# Patient Record
Sex: MALE | NOT HISPANIC OR LATINO | Employment: FULL TIME | URBAN - METROPOLITAN AREA
[De-identification: names, ages, dates, MRNs, and addresses within clinical notes are randomized per-mention and may not be internally consistent; named-entity substitution may affect disease eponyms.]

---

## 2017-04-05 ENCOUNTER — TRANSCRIBE ORDERS (OUTPATIENT)
Dept: ADMINISTRATIVE | Facility: HOSPITAL | Age: 47
End: 2017-04-05

## 2017-04-05 DIAGNOSIS — K21.9 GASTROESOPHAGEAL REFLUX DISEASE WITHOUT ESOPHAGITIS: Primary | ICD-10-CM

## 2017-04-10 ENCOUNTER — HOSPITAL ENCOUNTER (OUTPATIENT)
Dept: RADIOLOGY | Age: 47
Discharge: HOME | End: 2017-04-10
Payer: COMMERCIAL

## 2017-04-10 DIAGNOSIS — K21.9 GASTROESOPHAGEAL REFLUX DISEASE WITHOUT ESOPHAGITIS: ICD-10-CM

## 2017-04-10 PROCEDURE — 76705 ECHO EXAM OF ABDOMEN: CPT

## 2017-07-03 ENCOUNTER — TRANSCRIBE ORDERS (OUTPATIENT)
Dept: ADMINISTRATIVE | Facility: HOSPITAL | Age: 47
End: 2017-07-03

## 2017-07-03 DIAGNOSIS — K21.9 GASTROESOPHAGEAL REFLUX DISEASE, ESOPHAGITIS PRESENCE NOT SPECIFIED: Primary | ICD-10-CM

## 2017-07-12 ENCOUNTER — HOSPITAL ENCOUNTER (OUTPATIENT)
Dept: CT IMAGING | Facility: HOSPITAL | Age: 47
Discharge: HOME | End: 2017-07-12
Payer: COMMERCIAL

## 2017-07-12 DIAGNOSIS — K21.9 GASTROESOPHAGEAL REFLUX DISEASE, ESOPHAGITIS PRESENCE NOT SPECIFIED: ICD-10-CM

## 2017-07-12 PROCEDURE — 70491 CT SOFT TISSUE NECK W/DYE: CPT

## 2017-07-12 RX ADMIN — IOHEXOL 85 ML: 350 INJECTION, SOLUTION INTRAVENOUS at 13:24

## 2018-09-10 ENCOUNTER — TRANSCRIBE ORDERS (OUTPATIENT)
Dept: ADMINISTRATIVE | Facility: HOSPITAL | Age: 48
End: 2018-09-10

## 2018-09-10 DIAGNOSIS — K21.9 GASTROESOPHAGEAL REFLUX DISEASE, ESOPHAGITIS PRESENCE NOT SPECIFIED: Primary | ICD-10-CM

## 2018-10-04 ENCOUNTER — HOSPITAL ENCOUNTER (OUTPATIENT)
Dept: NUCLEAR MEDICINE | Facility: HOSPITAL | Age: 48
Discharge: HOME | End: 2018-10-04
Payer: COMMERCIAL

## 2018-10-04 DIAGNOSIS — K21.9 GASTROESOPHAGEAL REFLUX DISEASE, ESOPHAGITIS PRESENCE NOT SPECIFIED: ICD-10-CM

## 2018-10-04 PROCEDURE — 78264 GASTRIC EMPTYING IMG STUDY: CPT

## 2018-10-04 PROCEDURE — A9541 TC99M SULFUR COLLOID: HCPCS

## 2019-02-11 ENCOUNTER — HOSPITAL ENCOUNTER (OUTPATIENT)
Dept: RADIOLOGY | Facility: HOSPITAL | Age: 49
Discharge: HOME | End: 2019-02-11
Payer: COMMERCIAL

## 2019-02-11 ENCOUNTER — TRANSCRIBE ORDERS (OUTPATIENT)
Dept: ADMINISTRATIVE | Facility: HOSPITAL | Age: 49
End: 2019-02-11

## 2019-02-11 DIAGNOSIS — M25.532 BILATERAL WRIST PAIN: ICD-10-CM

## 2019-02-11 DIAGNOSIS — M25.531 BILATERAL WRIST PAIN: Primary | ICD-10-CM

## 2019-02-11 DIAGNOSIS — M25.532 BILATERAL WRIST PAIN: Primary | ICD-10-CM

## 2019-02-11 DIAGNOSIS — M25.531 BILATERAL WRIST PAIN: ICD-10-CM

## 2019-02-11 PROCEDURE — 73221 MRI JOINT UPR EXTREM W/O DYE: CPT

## 2019-05-06 ENCOUNTER — EVALUATION (OUTPATIENT)
Dept: OCCUPATIONAL THERAPY | Facility: CLINIC | Age: 49
End: 2019-05-06
Payer: COMMERCIAL

## 2019-05-06 DIAGNOSIS — S63.592A TFCC (TRIANGULAR FIBROCARTILAGE COMPLEX) TEAR, LEFT, INITIAL ENCOUNTER: Primary | ICD-10-CM

## 2019-05-06 PROCEDURE — 97140 MANUAL THERAPY 1/> REGIONS: CPT | Performed by: OCCUPATIONAL THERAPIST

## 2019-05-06 PROCEDURE — 97165 OT EVAL LOW COMPLEX 30 MIN: CPT | Performed by: OCCUPATIONAL THERAPIST

## 2019-05-06 PROCEDURE — 97010 HOT OR COLD PACKS THERAPY: CPT | Performed by: OCCUPATIONAL THERAPIST

## 2019-05-08 ENCOUNTER — OFFICE VISIT (OUTPATIENT)
Dept: OCCUPATIONAL THERAPY | Facility: CLINIC | Age: 49
End: 2019-05-08
Payer: COMMERCIAL

## 2019-05-08 DIAGNOSIS — S63.592A TFCC (TRIANGULAR FIBROCARTILAGE COMPLEX) TEAR, LEFT, INITIAL ENCOUNTER: Primary | ICD-10-CM

## 2019-05-08 PROCEDURE — 97140 MANUAL THERAPY 1/> REGIONS: CPT | Performed by: OCCUPATIONAL THERAPIST

## 2019-05-08 PROCEDURE — 97110 THERAPEUTIC EXERCISES: CPT | Performed by: OCCUPATIONAL THERAPIST

## 2019-05-08 PROCEDURE — 97010 HOT OR COLD PACKS THERAPY: CPT | Performed by: OCCUPATIONAL THERAPIST

## 2019-05-10 ENCOUNTER — EVALUATION (OUTPATIENT)
Dept: PHYSICAL THERAPY | Facility: CLINIC | Age: 49
End: 2019-05-10
Payer: COMMERCIAL

## 2019-05-10 ENCOUNTER — TRANSCRIBE ORDERS (OUTPATIENT)
Dept: PHYSICAL THERAPY | Facility: CLINIC | Age: 49
End: 2019-05-10

## 2019-05-10 DIAGNOSIS — M75.02 ADHESIVE CAPSULITIS OF LEFT SHOULDER: Primary | ICD-10-CM

## 2019-05-10 PROCEDURE — 97110 THERAPEUTIC EXERCISES: CPT | Performed by: PHYSICAL THERAPIST

## 2019-05-10 PROCEDURE — 97161 PT EVAL LOW COMPLEX 20 MIN: CPT | Performed by: PHYSICAL THERAPIST

## 2019-05-13 ENCOUNTER — APPOINTMENT (OUTPATIENT)
Dept: OCCUPATIONAL THERAPY | Facility: CLINIC | Age: 49
End: 2019-05-13
Payer: COMMERCIAL

## 2019-05-14 ENCOUNTER — OFFICE VISIT (OUTPATIENT)
Dept: OCCUPATIONAL THERAPY | Facility: CLINIC | Age: 49
End: 2019-05-14
Payer: COMMERCIAL

## 2019-05-14 ENCOUNTER — APPOINTMENT (OUTPATIENT)
Dept: OCCUPATIONAL THERAPY | Facility: CLINIC | Age: 49
End: 2019-05-14
Payer: COMMERCIAL

## 2019-05-14 ENCOUNTER — APPOINTMENT (OUTPATIENT)
Dept: PHYSICAL THERAPY | Facility: CLINIC | Age: 49
End: 2019-05-14
Payer: COMMERCIAL

## 2019-05-14 ENCOUNTER — OFFICE VISIT (OUTPATIENT)
Dept: PHYSICAL THERAPY | Facility: CLINIC | Age: 49
End: 2019-05-14
Payer: COMMERCIAL

## 2019-05-14 DIAGNOSIS — S63.592A TFCC (TRIANGULAR FIBROCARTILAGE COMPLEX) TEAR, LEFT, INITIAL ENCOUNTER: Primary | ICD-10-CM

## 2019-05-14 DIAGNOSIS — M75.02 ADHESIVE CAPSULITIS OF LEFT SHOULDER: Primary | ICD-10-CM

## 2019-05-14 PROCEDURE — 97110 THERAPEUTIC EXERCISES: CPT

## 2019-05-14 PROCEDURE — 97112 NEUROMUSCULAR REEDUCATION: CPT

## 2019-05-14 PROCEDURE — 97140 MANUAL THERAPY 1/> REGIONS: CPT | Performed by: OCCUPATIONAL THERAPIST

## 2019-05-14 PROCEDURE — 97110 THERAPEUTIC EXERCISES: CPT | Performed by: OCCUPATIONAL THERAPIST

## 2019-05-14 PROCEDURE — 97140 MANUAL THERAPY 1/> REGIONS: CPT

## 2019-05-15 ENCOUNTER — APPOINTMENT (OUTPATIENT)
Dept: OCCUPATIONAL THERAPY | Facility: CLINIC | Age: 49
End: 2019-05-15
Payer: COMMERCIAL

## 2019-05-16 ENCOUNTER — OFFICE VISIT (OUTPATIENT)
Dept: PHYSICAL THERAPY | Facility: CLINIC | Age: 49
End: 2019-05-16
Payer: COMMERCIAL

## 2019-05-16 ENCOUNTER — OFFICE VISIT (OUTPATIENT)
Dept: OCCUPATIONAL THERAPY | Facility: CLINIC | Age: 49
End: 2019-05-16
Payer: COMMERCIAL

## 2019-05-16 DIAGNOSIS — M75.02 ADHESIVE CAPSULITIS OF LEFT SHOULDER: Primary | ICD-10-CM

## 2019-05-16 DIAGNOSIS — S63.592A TFCC (TRIANGULAR FIBROCARTILAGE COMPLEX) TEAR, LEFT, INITIAL ENCOUNTER: Primary | ICD-10-CM

## 2019-05-16 PROCEDURE — 97110 THERAPEUTIC EXERCISES: CPT | Performed by: OCCUPATIONAL THERAPIST

## 2019-05-16 PROCEDURE — 97140 MANUAL THERAPY 1/> REGIONS: CPT | Performed by: OCCUPATIONAL THERAPIST

## 2019-05-16 PROCEDURE — 97140 MANUAL THERAPY 1/> REGIONS: CPT | Performed by: PHYSICAL THERAPIST

## 2019-05-16 PROCEDURE — 97110 THERAPEUTIC EXERCISES: CPT | Performed by: PHYSICAL THERAPIST

## 2019-05-20 ENCOUNTER — APPOINTMENT (OUTPATIENT)
Dept: OCCUPATIONAL THERAPY | Facility: CLINIC | Age: 49
End: 2019-05-20
Payer: COMMERCIAL

## 2019-05-21 ENCOUNTER — OFFICE VISIT (OUTPATIENT)
Dept: PHYSICAL THERAPY | Facility: CLINIC | Age: 49
End: 2019-05-21
Payer: COMMERCIAL

## 2019-05-21 ENCOUNTER — OFFICE VISIT (OUTPATIENT)
Dept: OCCUPATIONAL THERAPY | Facility: CLINIC | Age: 49
End: 2019-05-21
Payer: COMMERCIAL

## 2019-05-21 DIAGNOSIS — S63.592A TFCC (TRIANGULAR FIBROCARTILAGE COMPLEX) TEAR, LEFT, INITIAL ENCOUNTER: Primary | ICD-10-CM

## 2019-05-21 DIAGNOSIS — M75.02 ADHESIVE CAPSULITIS OF LEFT SHOULDER: Primary | ICD-10-CM

## 2019-05-21 PROCEDURE — 97110 THERAPEUTIC EXERCISES: CPT | Performed by: OCCUPATIONAL THERAPIST

## 2019-05-21 PROCEDURE — 97140 MANUAL THERAPY 1/> REGIONS: CPT | Performed by: OCCUPATIONAL THERAPIST

## 2019-05-21 PROCEDURE — 97110 THERAPEUTIC EXERCISES: CPT

## 2019-05-21 PROCEDURE — 97140 MANUAL THERAPY 1/> REGIONS: CPT

## 2019-05-22 ENCOUNTER — APPOINTMENT (OUTPATIENT)
Dept: OCCUPATIONAL THERAPY | Facility: CLINIC | Age: 49
End: 2019-05-22
Payer: COMMERCIAL

## 2019-05-23 ENCOUNTER — APPOINTMENT (OUTPATIENT)
Dept: PHYSICAL THERAPY | Facility: CLINIC | Age: 49
End: 2019-05-23
Payer: COMMERCIAL

## 2019-05-24 ENCOUNTER — OFFICE VISIT (OUTPATIENT)
Dept: OCCUPATIONAL THERAPY | Facility: CLINIC | Age: 49
End: 2019-05-24
Payer: COMMERCIAL

## 2019-05-24 ENCOUNTER — APPOINTMENT (OUTPATIENT)
Dept: PHYSICAL THERAPY | Facility: CLINIC | Age: 49
End: 2019-05-24
Payer: COMMERCIAL

## 2019-05-24 ENCOUNTER — OFFICE VISIT (OUTPATIENT)
Dept: PHYSICAL THERAPY | Facility: CLINIC | Age: 49
End: 2019-05-24
Payer: COMMERCIAL

## 2019-05-24 DIAGNOSIS — M75.02 ADHESIVE CAPSULITIS OF LEFT SHOULDER: Primary | ICD-10-CM

## 2019-05-24 DIAGNOSIS — S63.592A TFCC (TRIANGULAR FIBROCARTILAGE COMPLEX) TEAR, LEFT, INITIAL ENCOUNTER: Primary | ICD-10-CM

## 2019-05-24 PROCEDURE — 97140 MANUAL THERAPY 1/> REGIONS: CPT | Performed by: OCCUPATIONAL THERAPIST

## 2019-05-24 PROCEDURE — 97140 MANUAL THERAPY 1/> REGIONS: CPT

## 2019-05-24 PROCEDURE — 97110 THERAPEUTIC EXERCISES: CPT | Performed by: OCCUPATIONAL THERAPIST

## 2019-05-24 PROCEDURE — 97110 THERAPEUTIC EXERCISES: CPT

## 2019-05-28 ENCOUNTER — OFFICE VISIT (OUTPATIENT)
Dept: OCCUPATIONAL THERAPY | Facility: CLINIC | Age: 49
End: 2019-05-28
Payer: COMMERCIAL

## 2019-05-28 ENCOUNTER — OFFICE VISIT (OUTPATIENT)
Dept: PHYSICAL THERAPY | Facility: CLINIC | Age: 49
End: 2019-05-28
Payer: COMMERCIAL

## 2019-05-28 DIAGNOSIS — S63.592A TFCC (TRIANGULAR FIBROCARTILAGE COMPLEX) TEAR, LEFT, INITIAL ENCOUNTER: Primary | ICD-10-CM

## 2019-05-28 DIAGNOSIS — M75.02 ADHESIVE CAPSULITIS OF LEFT SHOULDER: Primary | ICD-10-CM

## 2019-05-28 PROCEDURE — 97140 MANUAL THERAPY 1/> REGIONS: CPT | Performed by: OCCUPATIONAL THERAPIST

## 2019-05-28 PROCEDURE — 97110 THERAPEUTIC EXERCISES: CPT | Performed by: OCCUPATIONAL THERAPIST

## 2019-05-28 PROCEDURE — 97140 MANUAL THERAPY 1/> REGIONS: CPT

## 2019-05-28 PROCEDURE — 97022 WHIRLPOOL THERAPY: CPT | Performed by: OCCUPATIONAL THERAPIST

## 2019-05-28 PROCEDURE — 97110 THERAPEUTIC EXERCISES: CPT

## 2019-05-29 ENCOUNTER — APPOINTMENT (OUTPATIENT)
Dept: OCCUPATIONAL THERAPY | Facility: CLINIC | Age: 49
End: 2019-05-29
Payer: COMMERCIAL

## 2019-05-30 ENCOUNTER — TRANSCRIBE ORDERS (OUTPATIENT)
Dept: OCCUPATIONAL THERAPY | Facility: CLINIC | Age: 49
End: 2019-05-30

## 2019-05-30 ENCOUNTER — OFFICE VISIT (OUTPATIENT)
Dept: OCCUPATIONAL THERAPY | Facility: CLINIC | Age: 49
End: 2019-05-30
Payer: COMMERCIAL

## 2019-05-30 ENCOUNTER — OFFICE VISIT (OUTPATIENT)
Dept: PHYSICAL THERAPY | Facility: CLINIC | Age: 49
End: 2019-05-30
Payer: COMMERCIAL

## 2019-05-30 DIAGNOSIS — S63.592A TFCC (TRIANGULAR FIBROCARTILAGE COMPLEX) TEAR, LEFT, INITIAL ENCOUNTER: Primary | ICD-10-CM

## 2019-05-30 DIAGNOSIS — M75.02 ADHESIVE CAPSULITIS OF LEFT SHOULDER: Primary | ICD-10-CM

## 2019-05-30 PROCEDURE — 97110 THERAPEUTIC EXERCISES: CPT | Performed by: OCCUPATIONAL THERAPIST

## 2019-05-30 PROCEDURE — 97110 THERAPEUTIC EXERCISES: CPT

## 2019-05-30 PROCEDURE — 97140 MANUAL THERAPY 1/> REGIONS: CPT | Performed by: OCCUPATIONAL THERAPIST

## 2019-05-30 PROCEDURE — 97140 MANUAL THERAPY 1/> REGIONS: CPT

## 2019-06-03 ENCOUNTER — APPOINTMENT (OUTPATIENT)
Dept: OCCUPATIONAL THERAPY | Facility: CLINIC | Age: 49
End: 2019-06-03
Payer: COMMERCIAL

## 2019-06-04 ENCOUNTER — OFFICE VISIT (OUTPATIENT)
Dept: PHYSICAL THERAPY | Facility: CLINIC | Age: 49
End: 2019-06-04
Payer: COMMERCIAL

## 2019-06-04 ENCOUNTER — OFFICE VISIT (OUTPATIENT)
Dept: OCCUPATIONAL THERAPY | Facility: CLINIC | Age: 49
End: 2019-06-04
Payer: COMMERCIAL

## 2019-06-04 DIAGNOSIS — M75.02 ADHESIVE CAPSULITIS OF LEFT SHOULDER: Primary | ICD-10-CM

## 2019-06-04 DIAGNOSIS — S63.592A TFCC (TRIANGULAR FIBROCARTILAGE COMPLEX) TEAR, LEFT, INITIAL ENCOUNTER: Primary | ICD-10-CM

## 2019-06-04 PROCEDURE — 97110 THERAPEUTIC EXERCISES: CPT | Performed by: PHYSICAL THERAPIST

## 2019-06-04 PROCEDURE — 97112 NEUROMUSCULAR REEDUCATION: CPT | Performed by: PHYSICAL THERAPIST

## 2019-06-04 PROCEDURE — 97110 THERAPEUTIC EXERCISES: CPT | Performed by: OCCUPATIONAL THERAPIST

## 2019-06-04 PROCEDURE — 97140 MANUAL THERAPY 1/> REGIONS: CPT | Performed by: PHYSICAL THERAPIST

## 2019-06-04 PROCEDURE — 97140 MANUAL THERAPY 1/> REGIONS: CPT | Performed by: OCCUPATIONAL THERAPIST

## 2019-06-05 ENCOUNTER — APPOINTMENT (OUTPATIENT)
Dept: OCCUPATIONAL THERAPY | Facility: CLINIC | Age: 49
End: 2019-06-05
Payer: COMMERCIAL

## 2019-06-06 ENCOUNTER — OFFICE VISIT (OUTPATIENT)
Dept: PHYSICAL THERAPY | Facility: CLINIC | Age: 49
End: 2019-06-06
Payer: COMMERCIAL

## 2019-06-06 ENCOUNTER — OFFICE VISIT (OUTPATIENT)
Dept: OCCUPATIONAL THERAPY | Facility: CLINIC | Age: 49
End: 2019-06-06
Payer: COMMERCIAL

## 2019-06-06 DIAGNOSIS — M75.02 ADHESIVE CAPSULITIS OF LEFT SHOULDER: Primary | ICD-10-CM

## 2019-06-06 DIAGNOSIS — S63.592A TFCC (TRIANGULAR FIBROCARTILAGE COMPLEX) TEAR, LEFT, INITIAL ENCOUNTER: Primary | ICD-10-CM

## 2019-06-06 PROCEDURE — 97110 THERAPEUTIC EXERCISES: CPT | Performed by: PHYSICAL THERAPY ASSISTANT

## 2019-06-06 PROCEDURE — 97035 APP MDLTY 1+ULTRASOUND EA 15: CPT | Performed by: OCCUPATIONAL THERAPIST

## 2019-06-06 PROCEDURE — 97140 MANUAL THERAPY 1/> REGIONS: CPT | Performed by: PHYSICAL THERAPY ASSISTANT

## 2019-06-06 PROCEDURE — 97140 MANUAL THERAPY 1/> REGIONS: CPT | Performed by: OCCUPATIONAL THERAPIST

## 2019-06-06 PROCEDURE — 97110 THERAPEUTIC EXERCISES: CPT | Performed by: OCCUPATIONAL THERAPIST

## 2019-06-10 ENCOUNTER — EVALUATION (OUTPATIENT)
Dept: PHYSICAL THERAPY | Facility: CLINIC | Age: 49
End: 2019-06-10
Payer: COMMERCIAL

## 2019-06-10 ENCOUNTER — OFFICE VISIT (OUTPATIENT)
Dept: OCCUPATIONAL THERAPY | Facility: CLINIC | Age: 49
End: 2019-06-10
Payer: COMMERCIAL

## 2019-06-10 ENCOUNTER — APPOINTMENT (OUTPATIENT)
Dept: OCCUPATIONAL THERAPY | Facility: CLINIC | Age: 49
End: 2019-06-10
Payer: COMMERCIAL

## 2019-06-10 DIAGNOSIS — M75.02 ADHESIVE CAPSULITIS OF LEFT SHOULDER: Primary | ICD-10-CM

## 2019-06-10 DIAGNOSIS — S63.592A TFCC (TRIANGULAR FIBROCARTILAGE COMPLEX) TEAR, LEFT, INITIAL ENCOUNTER: Primary | ICD-10-CM

## 2019-06-10 PROCEDURE — 97110 THERAPEUTIC EXERCISES: CPT | Performed by: OCCUPATIONAL THERAPIST

## 2019-06-10 PROCEDURE — 97112 NEUROMUSCULAR REEDUCATION: CPT | Performed by: PHYSICAL THERAPIST

## 2019-06-10 PROCEDURE — 97140 MANUAL THERAPY 1/> REGIONS: CPT | Performed by: OCCUPATIONAL THERAPIST

## 2019-06-10 PROCEDURE — 97140 MANUAL THERAPY 1/> REGIONS: CPT | Performed by: PHYSICAL THERAPIST

## 2019-06-10 PROCEDURE — 97110 THERAPEUTIC EXERCISES: CPT | Performed by: PHYSICAL THERAPIST

## 2019-06-12 ENCOUNTER — OFFICE VISIT (OUTPATIENT)
Dept: PHYSICAL THERAPY | Facility: CLINIC | Age: 49
End: 2019-06-12
Payer: COMMERCIAL

## 2019-06-12 ENCOUNTER — APPOINTMENT (OUTPATIENT)
Dept: OCCUPATIONAL THERAPY | Facility: CLINIC | Age: 49
End: 2019-06-12
Payer: COMMERCIAL

## 2019-06-12 ENCOUNTER — OFFICE VISIT (OUTPATIENT)
Dept: OCCUPATIONAL THERAPY | Facility: CLINIC | Age: 49
End: 2019-06-12
Payer: COMMERCIAL

## 2019-06-12 DIAGNOSIS — S63.592A TFCC (TRIANGULAR FIBROCARTILAGE COMPLEX) TEAR, LEFT, INITIAL ENCOUNTER: Primary | ICD-10-CM

## 2019-06-12 DIAGNOSIS — M75.02 ADHESIVE CAPSULITIS OF LEFT SHOULDER: Primary | ICD-10-CM

## 2019-06-12 PROCEDURE — 97140 MANUAL THERAPY 1/> REGIONS: CPT

## 2019-06-12 PROCEDURE — 97110 THERAPEUTIC EXERCISES: CPT

## 2019-06-12 PROCEDURE — 97140 MANUAL THERAPY 1/> REGIONS: CPT | Performed by: OCCUPATIONAL THERAPIST

## 2019-06-12 PROCEDURE — 97035 APP MDLTY 1+ULTRASOUND EA 15: CPT | Performed by: OCCUPATIONAL THERAPIST

## 2019-06-12 PROCEDURE — 97110 THERAPEUTIC EXERCISES: CPT | Performed by: OCCUPATIONAL THERAPIST

## 2019-06-12 PROCEDURE — 97112 NEUROMUSCULAR REEDUCATION: CPT

## 2019-06-13 ENCOUNTER — APPOINTMENT (OUTPATIENT)
Dept: PHYSICAL THERAPY | Facility: CLINIC | Age: 49
End: 2019-06-13
Payer: COMMERCIAL

## 2019-06-17 ENCOUNTER — APPOINTMENT (OUTPATIENT)
Dept: OCCUPATIONAL THERAPY | Facility: CLINIC | Age: 49
End: 2019-06-17
Payer: COMMERCIAL

## 2019-06-18 ENCOUNTER — APPOINTMENT (OUTPATIENT)
Dept: PHYSICAL THERAPY | Facility: CLINIC | Age: 49
End: 2019-06-18
Payer: COMMERCIAL

## 2019-06-18 ENCOUNTER — APPOINTMENT (OUTPATIENT)
Dept: OCCUPATIONAL THERAPY | Facility: CLINIC | Age: 49
End: 2019-06-18
Payer: COMMERCIAL

## 2019-06-19 ENCOUNTER — APPOINTMENT (OUTPATIENT)
Dept: OCCUPATIONAL THERAPY | Facility: CLINIC | Age: 49
End: 2019-06-19
Payer: COMMERCIAL

## 2019-06-19 ENCOUNTER — OFFICE VISIT (OUTPATIENT)
Dept: OCCUPATIONAL THERAPY | Facility: CLINIC | Age: 49
End: 2019-06-19
Payer: COMMERCIAL

## 2019-06-19 ENCOUNTER — OFFICE VISIT (OUTPATIENT)
Dept: PHYSICAL THERAPY | Facility: CLINIC | Age: 49
End: 2019-06-19
Payer: COMMERCIAL

## 2019-06-19 DIAGNOSIS — S63.592A TFCC (TRIANGULAR FIBROCARTILAGE COMPLEX) TEAR, LEFT, INITIAL ENCOUNTER: Primary | ICD-10-CM

## 2019-06-19 DIAGNOSIS — M75.02 ADHESIVE CAPSULITIS OF LEFT SHOULDER: Primary | ICD-10-CM

## 2019-06-19 PROCEDURE — 97110 THERAPEUTIC EXERCISES: CPT

## 2019-06-19 PROCEDURE — 97035 APP MDLTY 1+ULTRASOUND EA 15: CPT

## 2019-06-19 PROCEDURE — 97140 MANUAL THERAPY 1/> REGIONS: CPT

## 2019-06-19 PROCEDURE — 97112 NEUROMUSCULAR REEDUCATION: CPT

## 2019-06-20 ENCOUNTER — OFFICE VISIT (OUTPATIENT)
Dept: PHYSICAL THERAPY | Facility: CLINIC | Age: 49
End: 2019-06-20
Payer: COMMERCIAL

## 2019-06-20 ENCOUNTER — OFFICE VISIT (OUTPATIENT)
Dept: OCCUPATIONAL THERAPY | Facility: CLINIC | Age: 49
End: 2019-06-20
Payer: COMMERCIAL

## 2019-06-20 DIAGNOSIS — M75.02 ADHESIVE CAPSULITIS OF LEFT SHOULDER: Primary | ICD-10-CM

## 2019-06-20 DIAGNOSIS — S63.592A TFCC (TRIANGULAR FIBROCARTILAGE COMPLEX) TEAR, LEFT, INITIAL ENCOUNTER: Primary | ICD-10-CM

## 2019-06-20 PROCEDURE — 97110 THERAPEUTIC EXERCISES: CPT | Performed by: OCCUPATIONAL THERAPIST

## 2019-06-20 PROCEDURE — 97140 MANUAL THERAPY 1/> REGIONS: CPT | Performed by: OCCUPATIONAL THERAPIST

## 2019-06-20 PROCEDURE — 97140 MANUAL THERAPY 1/> REGIONS: CPT | Performed by: PHYSICAL THERAPIST

## 2019-06-20 PROCEDURE — 97112 NEUROMUSCULAR REEDUCATION: CPT | Performed by: PHYSICAL THERAPIST

## 2019-06-20 PROCEDURE — 97035 APP MDLTY 1+ULTRASOUND EA 15: CPT | Performed by: OCCUPATIONAL THERAPIST

## 2019-06-20 PROCEDURE — 97110 THERAPEUTIC EXERCISES: CPT | Performed by: PHYSICAL THERAPIST

## 2019-06-24 ENCOUNTER — APPOINTMENT (OUTPATIENT)
Dept: OCCUPATIONAL THERAPY | Facility: CLINIC | Age: 49
End: 2019-06-24
Payer: COMMERCIAL

## 2019-06-25 ENCOUNTER — OFFICE VISIT (OUTPATIENT)
Dept: PHYSICAL THERAPY | Facility: CLINIC | Age: 49
End: 2019-06-25
Payer: COMMERCIAL

## 2019-06-25 ENCOUNTER — OFFICE VISIT (OUTPATIENT)
Dept: OCCUPATIONAL THERAPY | Facility: CLINIC | Age: 49
End: 2019-06-25
Payer: COMMERCIAL

## 2019-06-25 DIAGNOSIS — M75.02 ADHESIVE CAPSULITIS OF LEFT SHOULDER: Primary | ICD-10-CM

## 2019-06-25 DIAGNOSIS — S63.592A TFCC (TRIANGULAR FIBROCARTILAGE COMPLEX) TEAR, LEFT, INITIAL ENCOUNTER: Primary | ICD-10-CM

## 2019-06-25 PROCEDURE — 97112 NEUROMUSCULAR REEDUCATION: CPT | Performed by: PHYSICAL THERAPIST

## 2019-06-25 PROCEDURE — 97140 MANUAL THERAPY 1/> REGIONS: CPT | Performed by: PHYSICAL THERAPIST

## 2019-06-25 PROCEDURE — 97140 MANUAL THERAPY 1/> REGIONS: CPT | Performed by: OCCUPATIONAL THERAPIST

## 2019-06-25 PROCEDURE — 97110 THERAPEUTIC EXERCISES: CPT | Performed by: PHYSICAL THERAPIST

## 2019-06-25 PROCEDURE — 97110 THERAPEUTIC EXERCISES: CPT | Performed by: OCCUPATIONAL THERAPIST

## 2019-06-26 ENCOUNTER — APPOINTMENT (OUTPATIENT)
Dept: OCCUPATIONAL THERAPY | Facility: CLINIC | Age: 49
End: 2019-06-26
Payer: COMMERCIAL

## 2019-06-27 ENCOUNTER — APPOINTMENT (OUTPATIENT)
Dept: PHYSICAL THERAPY | Facility: CLINIC | Age: 49
End: 2019-06-27
Payer: COMMERCIAL

## 2019-06-27 ENCOUNTER — APPOINTMENT (OUTPATIENT)
Dept: OCCUPATIONAL THERAPY | Facility: CLINIC | Age: 49
End: 2019-06-27
Payer: COMMERCIAL

## 2019-07-01 ENCOUNTER — OFFICE VISIT (OUTPATIENT)
Dept: OCCUPATIONAL THERAPY | Facility: CLINIC | Age: 49
End: 2019-07-01
Payer: COMMERCIAL

## 2019-07-01 ENCOUNTER — TRANSCRIBE ORDERS (OUTPATIENT)
Dept: PHYSICAL THERAPY | Facility: CLINIC | Age: 49
End: 2019-07-01

## 2019-07-01 ENCOUNTER — OFFICE VISIT (OUTPATIENT)
Dept: PHYSICAL THERAPY | Facility: CLINIC | Age: 49
End: 2019-07-01
Payer: COMMERCIAL

## 2019-07-01 DIAGNOSIS — S63.592A TFCC (TRIANGULAR FIBROCARTILAGE COMPLEX) TEAR, LEFT, INITIAL ENCOUNTER: Primary | ICD-10-CM

## 2019-07-01 DIAGNOSIS — M75.02 ADHESIVE CAPSULITIS OF LEFT SHOULDER: Primary | ICD-10-CM

## 2019-07-01 PROCEDURE — 97110 THERAPEUTIC EXERCISES: CPT | Performed by: OCCUPATIONAL THERAPIST

## 2019-07-01 PROCEDURE — 97110 THERAPEUTIC EXERCISES: CPT

## 2019-07-01 PROCEDURE — 97530 THERAPEUTIC ACTIVITIES: CPT | Performed by: OCCUPATIONAL THERAPIST

## 2019-07-01 PROCEDURE — 97140 MANUAL THERAPY 1/> REGIONS: CPT | Performed by: OCCUPATIONAL THERAPIST

## 2019-07-01 PROCEDURE — 97112 NEUROMUSCULAR REEDUCATION: CPT

## 2019-07-01 PROCEDURE — 97140 MANUAL THERAPY 1/> REGIONS: CPT

## 2019-07-01 NOTE — LETTER
2019    Ramona Gan MD  [address] [facility] [address]    Patient: Herb Hampton   YOB: 1970   Date of Visit: 2019     Encounter Diagnosis     ICD-10-CM    1  TFCC (triangular fibrocartilage complex) tear, left, initial encounter B45 892R        Dear Dr Jerry Mik:    Please review the attached Plan of Care from [address] [place] [address] recent visit  Please verify that you agree therapy should continue by signing the attached document and sending it back to our office  If you have any questions or concerns, please don't hesitate to call  Sincerely,    Tiffany Thomas OT      Referring Provider:     I certify that I have read the below Plan of Care and certify the need for these services furnished under this plan of treatment while under my care  Ramona Gan MD  [address] [place] [address] 109: [phone]        Daily Note     Today's date: 2019  Patient name: Herb Hampton  : 1970  MRN: [id number]  Referring provider: India Brown MD  Dx:   Encounter Diagnosis     ICD-10-CM    1  TFCC (triangular fibrocartilage complex) tear, left, initial encounter S63 [id number]                   Subjective: I am doing better    I do my HEP       Objective: See treatment diary below          Precautions: sx 3/12/19    Daily Treatment Diary     Manual  6/10 6/12 6/19 6/20 6/25 7/1       retrograde 4m 4m 5m 5m 5m 5m       Graston L wrist 4m 4m 5m 4m 4m 4m       Grade 2 MOB wrist 4m 4m  3m 3m 3m                                     Exercise Diary  6/10 6/12 6/19 6/20 6/25 7/1       Intrinsic stretch x10 x10 x10 x10 x10 x10       Extrinsic stretch x10 x10 x10 x10 x10 x10       Wrist ROM x10 Dowel 2x10  Dowel  2x10 Dowel  2x10        p/s flexbar green  3x10 flexbar  green 3x10 RFB 2x10 x10 x10 Green  3x10       Wrist maze  x5  x5 x5        Hook/fist              gripping Black 3x10 BPW 3x10 Black PW  3x10 GPW BPW  3x10 BPW  3x10       Pinch pins   pegs Black 3x10 Black 3x10 black  3x10 Red  3x10 Black   3x10 Black  30x       Wrist e/f 3#  3x10 3#  3x10 1# 3x10 #2 3x10 2#  3x10 3#  3x10       Biceps curl      7  5#KB  3x10                                                                                                                            HEP- TGEs, Wrist AROM reviewed                Modalities  6/10 6/12 6/19 6/20 6/25 7/1       MHP 10m    10m 10m       CP post  5m 5' 5m 5m 5m       Cont US dw   8m 8' 8m                              Assessment  Assessment details: Pt  Has been  s/p L TFCC reapair for post surgical stiffness and edema, scar tissue, limited ROM and strength of L dominant hand with ADLs  Pt has decreased strength and ROM   He has improved use of L for light ADL   Functional limitations: Pt  is using his L hand for  ADLs   Prognosis: good    Goals  STG( 10 visits)  1  Independent with HEP- met   2  Decrease edema to WNLs L hand  -partially mat   3  Increase L wrist ROM by 10 degrees - met  LTG(20 visits)  1  Improve FOTO score to predicted outcome or greater  - not met   2   Improve L wrist ROM to WNLs- not met   3  L  strength > 50lbs    Plan  Patient would benefit from: skilled occupational therapy  Planned modality interventions: thermotherapy: hydrocollator packs, fluidotherapy and cryotherapy  Planned therapy interventions: joint mobilization, manual therapy, therapeutic exercise, stretching, strengthening, graded exercise, home exercise program, functional ROM exercises and fine motor coordination training  Frequency: 2x week  Plan of Care beginning date: 2019  Plan of Care expiration date: 2019  Treatment plan discussed with: patient        Subjective Evaluation    History of Present Illness  Mechanism of injury: Pt  Was performing CPR and injured his wrist 19  MRI showed a L TFCC tear, he underwent a TFCC 3/12/19        Quality of life: good    Pain  Current pain ratin  At worst pain ratin  Quality: discomfort and tight  Progression: improved    Social Support  Lives with: significant other    Employment status: working ()  Hand dominance: left    Treatments  Current treatment: occupational therapy  Patient Goals  Patient goals for therapy: increased motion, increased strength, return to work and decreased edema          Objective     Tenderness     Left Wrist/Hand   Tenderness in the TFCC  Neurological Testing     Sensation     Wrist/Hand   Left   Intact: light touch    Active Range of Motion     Left Elbow   Forearm supination: 70 previous 50 degrees   Forearm pronation:72 , previous 68 degrees     Left Wrist   Wrist flexion:60, previous  20 degrees    Wrist extension: 60 , previous 48 degrees   Radial deviation : 18, previous 10 degrees   Ulnar deviation:22, previous 10 degrees      Left Thumb   Opposition: Opposes to tip of 5th digit    Additional Active Range of Motion Details  Pt  Has limited L shoulder mobility        Strength/Myotome Testing     Left Wrist/Hand   Wrist extension:5; previous  3-  Wrist flexion:5 previous  3-     2 R/L= 101/50    Swelling     Left Wrist/Hand     Circumference wrist:17 2 , previous  17 7 cm/ R= 16 2           Plan: Continue per plan of care          Precautions: sx 3/12/19    Daily Treatment Diary     Manual  6/10 6/12 6/19 6/20 6/25 7/1       retrograde 4m 4m 5m 5m 5m 5m       Graston L wrist 4m 4m 5m 4m 4m 5m       Grade 2 MOB wrist 4m 4m  3m 3m 3m                                     Exercise Diary  6/10 6/12 6/19 6/20 6/25 7/1       Intrinsic stretch x10 x10 x10 x10 x10 x10       Extrinsic stretch x10 x10 x10 x10 x10 x10       Wrist ROM x10 Dowel 2x10  Dowel  2x10 Dowel  2x10        p/s flexbar green  3x10 flexbar  green 3x10 RFB 2x10 x10 x10 x10       Wrist maze  x5  x5 x5        Hook/fist              gripping Black 3x10 BPW 3x10 Black PW  3x10 GPW BPW  3x10        Pinch pins   w/sup Black 3x10 Black 3x10 black  3x10 Red  3x10 Black   3x10        Wrist e/f 3#  3x10 3#  3x10 1# 3x10 #2 3x10 2#  3x10                                                                                                                                          HEP- TGEs, Wrist AROM reviewed                Modalities  6/10 6/12 6/19 6/20 6/25        MHP 10m    10m        CP post  5m 5' 5m 5m        Cont US dw   8m 8' 8m

## 2019-07-01 NOTE — PROGRESS NOTES
Daily Note     Today's date: 2019  Patient name: Rosalinda Rojas  : 1970  MRN: [id number]  Referring provider: Trixie Gu MD  Dx:   Encounter Diagnosis     ICD-10-CM    1  TFCC (triangular fibrocartilage complex) tear, left, initial encounter S63 592A                   Subjective: I am doing better   I do my HEP       Objective: See treatment diary below          Precautions: sx 3/12/19    Daily Treatment Diary     Manual  6/10 6/12 6/19 6/20 6/25 7/1       retrograde 4m 4m 5m 5m 5m 5m       Graston L wrist 4m 4m 5m 4m 4m 4m       Grade 2 MOB wrist 4m 4m  3m 3m 3m                                     Exercise Diary  6/10 6/12 6/19 6/20 6/25 7/1       Intrinsic stretch x10 x10 x10 x10 x10 x10       Extrinsic stretch x10 x10 x10 x10 x10 x10       Wrist ROM x10 Dowel 2x10  Dowel  2x10 Dowel  2x10        p/s flexbar green  3x10 flexbar  green 3x10 RFB 2x10 x10 x10 Green  3x10       Wrist maze  x5  x5 x5        Hook/fist              gripping Black 3x10 BPW 3x10 Black PW  3x10 GPW BPW  3x10 BPW  3x10       Pinch pins   pegs Black 3x10 Black 3x10 black  3x10 Red  3x10 Black   3x10 Black  30x       Wrist e/f 3#  3x10 3#  3x10 1# 3x10 #2 3x10 2#  3x10 3#  3x10       Biceps curl      7  5#KB  3x10                                                                                                                            HEP- TGEs, Wrist AROM reviewed                Modalities  6/10 6/12 6/19 6/20 6/25 7       MHP 10m    10m 10m       CP post  5m 5' 5m 5m 5m       Cont US dw   8m 8' 8m                              Assessment  Assessment details: Pt  Has been  s/p L TFCC reapair for post surgical stiffness and edema, scar tissue, limited ROM and strength of L dominant hand with ADLs  Pt has decreased strength and ROM   He has improved use of L for light ADL   Functional limitations: Pt  is using his L hand for  ADLs   Prognosis: good    Goals  STG( 10 visits)  1  Independent with HEP- met   2   Decrease edema to WNLs L hand  -partially mat   3  Increase L wrist ROM by 10 degrees - met  LTG(20 visits)  1  Improve FOTO score to predicted outcome or greater  - not met   2   Improve L wrist ROM to WNLs- not met   3  L  strength > 50lbs    Plan  Patient would benefit from: skilled occupational therapy  Planned modality interventions: thermotherapy: hydrocollator packs, fluidotherapy and cryotherapy  Planned therapy interventions: joint mobilization, manual therapy, therapeutic exercise, stretching, strengthening, graded exercise, home exercise program, functional ROM exercises and fine motor coordination training  Frequency: 2x week  Plan of Care beginning date: 2019  Plan of Care expiration date: 2019  Treatment plan discussed with: patient        Subjective Evaluation    History of Present Illness  Mechanism of injury: Pt  Was performing CPR and injured his wrist 19  MRI showed a L TFCC tear, he underwent a TFCC 3/12/19  Quality of life: good    Pain  Current pain ratin  At worst pain ratin  Quality: discomfort and tight  Progression: improved    Social Support  Lives with: significant other    Employment status: working ()  Hand dominance: left    Treatments  Current treatment: occupational therapy  Patient Goals  Patient goals for therapy: increased motion, increased strength, return to work and decreased edema          Objective     Tenderness     Left Wrist/Hand   Tenderness in the TFCC       Neurological Testing     Sensation     Wrist/Hand   Left   Intact: light touch    Active Range of Motion     Left Elbow   Forearm supination: 70 previous 50 degrees   Forearm pronation:72 , previous 68 degrees     Left Wrist   Wrist flexion:60, previous  20 degrees    Wrist extension: 60 , previous 48 degrees   Radial deviation : 18, previous 10 degrees   Ulnar deviation:22, previous 10 degrees      Left Thumb   Opposition: Opposes to tip of 5th digit    Additional Active Range of Motion Details  Pt  Has limited L shoulder mobility        Strength/Myotome Testing     Left Wrist/Hand   Wrist extension:5; previous  3-  Wrist flexion:5 previous  3-     2 R/L= 101/50    Swelling     Left Wrist/Hand     Circumference wrist:17 2 , previous  17 7 cm/ R= 16 2           Plan: Continue per plan of care          Precautions: sx 3/12/19    Daily Treatment Diary     Manual  6/10 6/12 6/19 6/20 6/25 7/1       retrograde 4m 4m 5m 5m 5m 5m       Graston L wrist 4m 4m 5m 4m 4m 5m       Grade 2 MOB wrist 4m 4m  3m 3m 3m                                     Exercise Diary  6/10 6/12 6/19 6/20 6/25 7/1       Intrinsic stretch x10 x10 x10 x10 x10 x10       Extrinsic stretch x10 x10 x10 x10 x10 x10       Wrist ROM x10 Dowel 2x10  Dowel  2x10 Dowel  2x10        p/s flexbar green  3x10 flexbar  green 3x10 RFB 2x10 x10 x10 x10       Wrist maze  x5  x5 x5        Hook/fist              gripping Black 3x10 BPW 3x10 Black PW  3x10 GPW BPW  3x10        Pinch pins   w/sup Black 3x10 Black 3x10 black  3x10 Red  3x10 Black   3x10        Wrist e/f 3#  3x10 3#  3x10 1# 3x10 #2 3x10 2#  3x10                                                                                                                                          HEP- TGEs, Wrist AROM reviewed                Modalities  6/10 6/12 6/19 6/20 6/25        MHP 10m    10m        CP post  5m 5' 5m 5m        Cont US dw   8m 8' 8m

## 2019-07-01 NOTE — PROGRESS NOTES
Daily Note     Today's date: 2019  Patient name: Polly Sepulveda  : 1970  MRN: [id number]  Referring provider: Elda Heller MD  Dx:   Encounter Diagnosis     ICD-10-CM    1  Adhesive capsulitis of left shoulder M75 02                   Subjective: Patient reports he is a little more shift     Objective: See treatment diary below    Assessment: Patient completed documented program   Pt  Continues to make good progress in therapy  PROM continues to increase with minimal limitations, most with IR/ER  He continues to  Progress with strengthening TE's with no complaint of pain t/o  He does report some clicking with resistive flexion TE, and upon palpation possible superficial tendon rolling  Plan: Continue per plan of care  Progress treatment as tolerated           Daily Treatment Diary     DX: (L) Shoulder Adhesive Capsulitis   EPOC: 19  Precautions: standard  CO-MORBIDITES: NA  PERSONAL FACTORS:    Manual        (L) Sh' Mobs Post/ Inf 4'  DL SPT Post/ Inf 4' Post/ Inf 4'   Post/ Inf 4'  JR PT      (L) Sh' PROM 6'   6' 6' 6'      (L) Sh' IASTM          Perri Gann) Sh' MRE - PNF                        Exercise Diary  HEP                Pulleys   2'/2' 2'/2' 2'/2' 2'/2'     UBE     3'/3'               Table Slides  Flex  ABD          Cane ER Stretch   30"x3       Gilmore Bell Sh' Ext Stretch                    Cane Flex Stretch          Posterior capsule stretch 6/10         Doorway Pec Stretch 6/10                   Std Sh' Flex  3#  5"x20 3#  20x 3#  20x 3#  20x     Std Sh' Scaption  3#  5"x20 3#  20x 3#  20x 3#  20x     Std Sh' ABD  3#  5"x20 3#   20x 3#  20x 3#  20x               TB Sh' Row 6/ MTB  20   MTB  20x MTB  20x MTB &  OTB  20x     TB Sh' Ext / MTB  20 MTB   20x MTB  20x MTB &  OTB  20x     TB Triceps / MTB  20 MTB  20x MTB  20x MTB &  OTB  20x     TB Sh' ER / MTB  20 MTB  20x MTB  20x MTB  20x     TB Sh' IR  MTB  20 MTB  20x MTB  20x MTB  20x     TB b/l ER 6/12 MTB  20        TB Horz ABD 6/12 BTB  20 BTB  20x MTB  20x MTB  20     TB PNF D1 Flex    BTB  20x BTB  20     TB PNF D2 Flex    BTB  20x BTB  20     Quadruped Shoulder Matrix                    Prone Sh' Ext  5"x20 1# x20 1# x20 2# x20     Prone Sh' VB  5"x20 1# x20 1# x20 2# x20     Prone Sh' ABD  5"x20 1# x20 1# x20 2# x20     Prone Sh' VF  5"x20 5"x20 1# x20 2# x20     Prone Sh' Flex  5"x20 5"x20 1# x20 2# x20         Modalities

## 2019-07-03 ENCOUNTER — OFFICE VISIT (OUTPATIENT)
Dept: OCCUPATIONAL THERAPY | Facility: CLINIC | Age: 49
End: 2019-07-03
Payer: COMMERCIAL

## 2019-07-03 ENCOUNTER — OFFICE VISIT (OUTPATIENT)
Dept: PHYSICAL THERAPY | Facility: CLINIC | Age: 49
End: 2019-07-03
Payer: COMMERCIAL

## 2019-07-03 DIAGNOSIS — S63.592A TFCC (TRIANGULAR FIBROCARTILAGE COMPLEX) TEAR, LEFT, INITIAL ENCOUNTER: Primary | ICD-10-CM

## 2019-07-03 DIAGNOSIS — M75.02 ADHESIVE CAPSULITIS OF LEFT SHOULDER: Primary | ICD-10-CM

## 2019-07-03 PROCEDURE — 97140 MANUAL THERAPY 1/> REGIONS: CPT | Performed by: OCCUPATIONAL THERAPIST

## 2019-07-03 PROCEDURE — 97140 MANUAL THERAPY 1/> REGIONS: CPT

## 2019-07-03 PROCEDURE — 97530 THERAPEUTIC ACTIVITIES: CPT | Performed by: OCCUPATIONAL THERAPIST

## 2019-07-03 PROCEDURE — 97110 THERAPEUTIC EXERCISES: CPT

## 2019-07-03 PROCEDURE — 97112 NEUROMUSCULAR REEDUCATION: CPT

## 2019-07-03 PROCEDURE — 97110 THERAPEUTIC EXERCISES: CPT | Performed by: OCCUPATIONAL THERAPIST

## 2019-07-03 NOTE — PROGRESS NOTES
Daily Note     Today's date: 7/3/2019  Patient name: Melba Ash  : 1970  MRN: [id number]  Referring provider: Juli Mohan MD  Dx:   Encounter Diagnosis     ICD-10-CM    1  TFCC (triangular fibrocartilage complex) tear, left, initial encounter S63 592A                   Subjective: I am doing better   I do my HEP       Objective: See treatment diary below          Precautions: sx 3/12/19    Daily Treatment Diary     Manual  6/10 6/12 6/19 6/20 6/25 7/1 7/3      retrograde 4m 4m 5m 5m 5m 5m 5m      Graston L wrist 4m 4m 5m 4m 4m 4m 4m      Grade 2 MOB wrist 4m 4m  3m 3m 3m 3m                                    Exercise Diary  6/10 6/12 6/19 6/20 6/25 7/1 7/3      Intrinsic stretch x10 x10 x10 x10 x10 x10 x10      Extrinsic stretch x10 x10 x10 x10 x10 x10 x10      Wrist ROM x10 Dowel 2x10  Dowel  2x10 Dowel  2x10        p/s flexbar green  3x10 flexbar  green 3x10 RFB 2x10 x10 x10 Green  3x10 Green 3x10      Wrist maze  x5  x5 x5        Hook/fist              gripping Black 3x10 BPW 3x10 Black PW  3x10 GPW BPW  3x10 BPW  3x10 BPW 3x10      Pinch pins   pegs Black 3x10 Black 3x10 black  3x10 Red  3x10 Black   3x10 Black  30x Black x30      Wrist e/f 3#  3x10 3#  3x10 1# 3x10 #2 3x10 2#  3x10 3#  3x10 #3 3x10      Biceps curl      7  5#KB  3x10 7 5# KB 3x10                                                                                                                           HEP- TGEs, Wrist AROM reviewed                Modalities  6/10 6/12 6/19 6/20 6/25 7/1 7/3      MHP 10m    10m 10m 10m      CP post  5m 5' 5m 5m 5m       Cont US dw   8m 8' 8m             Assessment: Pt  With pain end range supination, progressing well with functional strength  Pt  Cleared for light duty work      Plan: Progress with POC

## 2019-07-03 NOTE — PROGRESS NOTES
Daily Note     Today's date: 7/3/2019  Patient name: Hermelindo David  : 1970  MRN: [id number]  Referring provider: Alana Hernandez MD  Dx:   Encounter Diagnosis     ICD-10-CM    1  Adhesive capsulitis of left shoulder M75 02                   Subjective: Patient reports he is feeling pretty good today overall, always a little more stiff in the mornings  Objective: See treatment diary below    Assessment: Patient completed documented program   Pt  Continues to make good progress in therapy  PROM continues to increase with minimal limitations, most limitation remains with IR/ER  He continues to  Progress with strengthening TE's with no complaint of pain t/o  Clicking continues with resistive flexion TE, and upon palpation possible superficial tendon rolling  Plan: Continue per plan of care  Progress treatment as tolerated           Daily Treatment Diary     DX: (L) Shoulder Adhesive Capsulitis   EPOC: 19  Precautions: standard  CO-MORBIDITES: NA  PERSONAL FACTORS:    Manual   7/3     (L) Sh' Mobs Post/ Inf 4'  DL SPT Post/ Inf 4' Post/ Inf 4'   Post/ Inf 4'  JR PT      (L) Sh' PROM 6'   6' 6' 6' 12'     (L) Sh' IASTM          Vini Millet) Sh' MRE - PNF                        Exercise Diary  HEP  73              Pulleys   2'/2' 2'/2' 2'/2' 2'/2' 2'/2'    UBE     3'/3' 3'/3'              Table Slides  Flex  ABD          Cane ER Stretch   30"x3       [place] Sh' Ext Stretch                    Cane Flex Stretch          Posterior capsule stretch 6/10         Doorway Pec Stretch 6/10                   Std Sh' Flex  3#  5"x20 3#  20x 3#  20x 3#  20x 3#  20x    Std Sh' Scaption  3#  5"x20 3#  20x 3#  20x 3#  20x 3#  20x    Std Sh' ABD  3#  5"x20 3#   20x 3#  20x 3#  20x 3#  20x              TB Sh' Row  MTB  20   MTB  20x MTB  20x MTB &  OTB  20x MTB &  OTB  20x    TB Sh' Ext  MTB  20 MTB   20x MTB  20x MTB &  OTB  20x MTB &  OTB  20x    TB Triceps  MTB  20 MTB  20x MTB  20x MTB &  OTB  20x MTB &  OTB  20x    TB Sh' ER 6/12 MTB  20 MTB  20x MTB  20x MTB  20x MTB  20x    TB Sh' IR 6/12 MTB  20 MTB  20x MTB  20x MTB  20x MTB  20x    TB b/l ER 6/12 MTB  20        TB Horz ABD 6/12 BTB  20 BTB  20x MTB  20x MTB  20 MTB  20x    TB PNF D1 Flex    BTB  20x BTB  20 BTB  20    TB PNF D2 Flex    BTB  20x BTB  20 BTB  20    Quadruped Shoulder Matrix                    Prone Sh' Ext  5"x20 1# x20 1# x20 2# x20 3#  20x    Prone Sh' VB  5"x20 1# x20 1# x20 2# x20 3#  20x    Prone Sh' ABD  5"x20 1# x20 1# x20 2# x20 3#  20x    Prone Sh' VF  5"x20 5"x20 1# x20 2# x20 3#  20x    Prone Sh' Flex  5"x20 5"x20 1# x20 2# x20 3#  20x    S/L ER      3#  20x    S/L ABD      3#  20x        Modalities

## 2019-07-08 ENCOUNTER — OFFICE VISIT (OUTPATIENT)
Dept: PHYSICAL THERAPY | Facility: CLINIC | Age: 49
End: 2019-07-08
Payer: COMMERCIAL

## 2019-07-08 ENCOUNTER — OFFICE VISIT (OUTPATIENT)
Dept: OCCUPATIONAL THERAPY | Facility: CLINIC | Age: 49
End: 2019-07-08
Payer: COMMERCIAL

## 2019-07-08 DIAGNOSIS — M75.02 ADHESIVE CAPSULITIS OF LEFT SHOULDER: Primary | ICD-10-CM

## 2019-07-08 DIAGNOSIS — S63.592A TFCC (TRIANGULAR FIBROCARTILAGE COMPLEX) TEAR, LEFT, INITIAL ENCOUNTER: Primary | ICD-10-CM

## 2019-07-08 PROCEDURE — 97110 THERAPEUTIC EXERCISES: CPT

## 2019-07-08 PROCEDURE — 97110 THERAPEUTIC EXERCISES: CPT | Performed by: OCCUPATIONAL THERAPIST

## 2019-07-08 PROCEDURE — 97112 NEUROMUSCULAR REEDUCATION: CPT

## 2019-07-08 PROCEDURE — 97140 MANUAL THERAPY 1/> REGIONS: CPT

## 2019-07-08 PROCEDURE — 97530 THERAPEUTIC ACTIVITIES: CPT | Performed by: OCCUPATIONAL THERAPIST

## 2019-07-08 NOTE — PROGRESS NOTES
Daily Note     Today's date: 2019  Patient name: Kelsey Bolton  : 1970  MRN: [id number]  Referring provider: Kate Treviño MD  Dx:   Encounter Diagnosis     ICD-10-CM    1  TFCC (triangular fibrocartilage complex) tear, left, initial encounter S63 592A                   Subjective: I am doing more so I am sore      Objective: See treatment diary below      Assessment: Tolerated treatment well  Patient has improved ROM and strength  Plan: Continue per plan of care        Precautions: sx 3/12/19    Daily Treatment Diary     Manual  6/10 6/12 6/19 6/20 6/25 7/1 7/      retrograde 4m 4m 5m 5m 5m 5m 3m      Graston L wrist 4m 4m 5m 4m 4m 5m 4m      Grade 2 MOB wrist 4m 4m  3m 3m 3m 3m                                      Exercise Diary  6/10 6/12 6/19 6/20 6/25 7/1 7/8      Intrinsic stretch x10 x10 x10 x10 x10 x10 x10      Extrinsic stretch x10 x10 x10 x10 x10 x10 x10      Wrist ROM x10 Dowel 2x10  Dowel  2x10 Dowel  2x10        p/s flexbar green  3x10 flexbar  green 3x10 RFB 2x10 x10 x10 x10 x10      Wrist maze  x5  x5 x5        Hook/fist              gripping Black 3x10 BPW 3x10 Black PW  3x10 GPW BPW  3x10  BPW  3x10      Pinch pins   w/sup Black 3x10 Black 3x10 black  3x10 Red  3x10 Black   3x10  Black  3x10      Wrist e/f 3#  3x10 3#  3x10 1# 3x10 #2 3x10 2#  3x10  2#  3x10      biceps       7 5KB  3x10                                                                                                                           HEP- TGEs, Wrist AROM reviewed                Modalities  6/10 6/12 6/19 6/20 6/25 7/1 7/8      MHP 10m    10m  10m      CP post  5m 5' 5m 5m  5m      Cont US dw   8m 8' 8m

## 2019-07-08 NOTE — PROGRESS NOTES
Daily Note     Today's date: 2019  Patient name: Juan Yuan  : 1970  MRN: [id number]  Referring provider: Olga Samuels MD  Dx:   Encounter Diagnosis     ICD-10-CM    1  Adhesive capsulitis of left shoulder M75 02                   Subjective: Patient reports he is doing well    Objective: See treatment diary below    Assessment: Patient completed documented program   Pt  Continues to make good progress in therapy  PROM continues to increase with minimal limitations, most limitation remains with IR/ER but increased with manual stretching  He continues to  Progress with strengthening TE's with no complaint of pain t/o  Plan: Continue per plan of care  Progress treatment as tolerated           Daily Treatment Diary     DX: (L) Shoulder Adhesive Capsulitis   EPOC: 19  Precautions: standard  CO-MORBIDITES: NA  PERSONAL FACTORS:    Manual  6/19 6/20 6/25 7/1 7/3 7/8    (L) Sh' Mobs Post/ Inf 4'  DL SPT Post/ Inf 4' Post/ Inf 4'   Post/ Inf 4'  JR PT      (L) Sh' PROM 6'   6' 6' 6' 12' 12'    (L) Sh' IASTM          Angella Fredi) Sh' MRE - PNF                        Exercise Diary  HEP 6/19 6/20 6/25 7/1 7/3 7/8             Pulleys   2'/2' 2'/2' 2'/2' 2'/2' 2'/2' 2'/2'   UBE     3'/3' 3'/3' 3'/3'             Table Slides  Flex  ABD          Cane ER Stretch   30"x3       Venu Maxwell Sh' Ext Stretch                    Cane Flex Stretch          Posterior capsule stretch 6/10         Doorway Pec Stretch 6/10                   Std Sh' Flex  3#  5"x20 3#  20x 3#  20x 3#  20x 3#  20x 3#  20x   Std Sh' Scaption  3#  5"x20 3#  20x 3#  20x 3#  20x 3#  20x 3#  20x   Std Sh' ABD  3#  5"x20 3#   20x 3#  20x 3#  20x 3#  20x 3#  20x             TB Sh' Row  MTB  20   MTB  20x MTB  20x MTB &  OTB  20x MTB &  OTB  20x MTB &  OTB  20x   TB Sh' Ext  MTB  20 MTB   20x MTB  20x MTB &  OTB  20x MTB &  OTB  20x MTB &  OTB  20x   TB Triceps 6/12 MTB  20 MTB  20x MTB  20x MTB &  OTB  20x MTB &  OTB  20x MTB &  OTB  20x   TB Sh' ER 6/12 MTB  20 MTB  20x MTB  20x MTB  20x MTB  20x MTB  20x   TB Sh' IR 6/12 MTB  20 MTB  20x MTB  20x MTB  20x MTB  20x MTB  20x   TB b/l ER 6/12 MTB  20        TB Horz ABD 6/12 BTB  20 BTB  20x MTB  20x MTB  20 MTB  20x MTB  20x   TB PNF D1 Flex    BTB  20x BTB  20 BTB  20 BTB  20   TB PNF D2 Flex    BTB  20x BTB  20 BTB  20 BTB  20   Quadruped Shoulder Matrix                    Prone Sh' Ext  5"x20 1# x20 1# x20 2# x20 3#  20x 3#  20x   Prone Sh' VB  5"x20 1# x20 1# x20 2# x20 3#  20x 3#  20x   Prone Sh' ABD  5"x20 1# x20 1# x20 2# x20 3#  20x 3#  20x   Prone Sh' VF  5"x20 5"x20 1# x20 2# x20 3#  20x 3#  20x   Prone Sh' Flex  5"x20 5"x20 1# x20 2# x20 3#  20x 3#  20x   S/L ER      3#  20x 3#  20x   S/L ABD      3#  20x 3#  20x   SD steering wheel       cw/ccw 10 ea   Body blade       35"x3   Flex bar OH       Red  1'       Modalities

## 2019-07-11 ENCOUNTER — OFFICE VISIT (OUTPATIENT)
Dept: OCCUPATIONAL THERAPY | Facility: CLINIC | Age: 49
End: 2019-07-11
Payer: COMMERCIAL

## 2019-07-11 ENCOUNTER — EVALUATION (OUTPATIENT)
Dept: PHYSICAL THERAPY | Facility: CLINIC | Age: 49
End: 2019-07-11
Payer: COMMERCIAL

## 2019-07-11 DIAGNOSIS — M75.02 ADHESIVE CAPSULITIS OF LEFT SHOULDER: Primary | ICD-10-CM

## 2019-07-11 DIAGNOSIS — S63.592A TFCC (TRIANGULAR FIBROCARTILAGE COMPLEX) TEAR, LEFT, INITIAL ENCOUNTER: Primary | ICD-10-CM

## 2019-07-11 PROCEDURE — 97530 THERAPEUTIC ACTIVITIES: CPT | Performed by: PHYSICAL THERAPIST

## 2019-07-11 PROCEDURE — 97110 THERAPEUTIC EXERCISES: CPT | Performed by: OCCUPATIONAL THERAPIST

## 2019-07-11 PROCEDURE — 97530 THERAPEUTIC ACTIVITIES: CPT | Performed by: OCCUPATIONAL THERAPIST

## 2019-07-11 PROCEDURE — 97140 MANUAL THERAPY 1/> REGIONS: CPT | Performed by: PHYSICAL THERAPIST

## 2019-07-11 PROCEDURE — 97140 MANUAL THERAPY 1/> REGIONS: CPT | Performed by: OCCUPATIONAL THERAPIST

## 2019-07-11 PROCEDURE — 97110 THERAPEUTIC EXERCISES: CPT | Performed by: PHYSICAL THERAPIST

## 2019-07-11 NOTE — PROGRESS NOTES
PT Re-Evaluation     Today's date: 2019  Patient name: Kasie Guerin  : 1970  MRN: [id number]  Referring provider: Kishan France MD  Dx:   Encounter Diagnosis     ICD-10-CM    1  Adhesive capsulitis of left shoulder M75 02                   Assessment  Assessment details: Since patient's last reassessment he displays with significant objective improvements in pain levels, strength, ROM, and function  Patient conitnues to make excellent progress with PT  He continues to be limited with higher level demands of his job  It is recommended to continue with skilled PT for an additional month focusing on ROM and function weaning to I HEP as appropriate  No further referral appears necessary at this time based upon examination results  Thank you very much for your referral    Impairments: abnormal coordination, abnormal muscle tone, abnormal or restricted ROM, abnormal movement, activity intolerance, impaired physical strength, lacks appropriate home exercise program, pain with function, scapular dyskinesis, poor posture  and poor body mechanics    Symptom irritability: moderateUnderstanding of Dx/Px/POC: good   Prognosis: good    Goals  ST  Decrease pain to 4/10 max in 2 weeks  2  Increase (L) Shoulder AROM: Flexion to 140 degrees, ABD to 130 degrees, Functional ER to C5, Functional IR to L3 in 2 weeks  - met  3  Increase (L) UE strength by 1/2 MMT grade in 2 weeks  - met  4  I with HEP in 2 weeks  - met  5  Improve FOTO score to 61 in 2 weeks  LT  Decrease pain to 1/10 max in 4 weeks  2  Increase (L) Shoulder AROM: Flexion to 160 degrees, ABD to 150 degrees, Functional ER to T1, Functional IR to T10 in 4 weeks  3  Increase (L) UE strength to 4+/5 all planes in 4 weeks  4  I with HEP in 4 weeks  5  Improve FOTO score to 71 in 4 weeks        Plan  Patient would benefit from: skilled physical therapy  Planned modality interventions: cryotherapy and thermotherapy: hydrocollator packs  Planned therapy interventions: joint mobilization, activity modification, manual therapy, motor coordination training, neuromuscular re-education, body mechanics training, patient education, postural training, strengthening, stretching, therapeutic activities, therapeutic exercise, functional ROM exercises and home exercise program  Frequency: 2x week  Duration in visits: 8  Duration in weeks: 4  Plan of Care beginning date: 2019  Plan of Care expiration date: 2019  Treatment plan discussed with: patient        Subjective Evaluation    History of Present Illness  Mechanism of injury: Patient reports since his last reassessment he sees improvements with pain levels, flexibility, strength, and function  He reports minimal stiffness after waking in the morning  He reports he has more flexibility to accomplish work related tasks such as drawing pistol from [employer]  He reports continued restrictions with UE secondary to wrist       Quality of life: good    Pain  Current pain ratin  At best pain ratin  At worst pain ratin  Location: (L) Shoulder   Quality: sharp, knife-like and dull ache    Social Support    Working:   Hand dominance: left      Diagnostic Tests  Abnormal MRI: Normal shoulder  Treatments  Previous treatment: occupational therapy  Current treatment: physical therapy and occupational therapy  Patient Goals  Patient goals for therapy: decreased pain, increased motion, increased strength, return to work and return to sport/leisure activities  Patient goal: Return to active lifestyle - running, sports         Objective     Static Posture     Shoulders  Rounded      Palpation     Additional Palpation Details  TTP (L) anterior/ posterior shoulder     Active Range of Motion   Left Shoulder   Flexion: 165 degrees   Extension: 45 degrees   Abduction: 180 degrees   External rotation BTH: T1   Internal rotation BTB: T10     Right Shoulder   Flexion: WFL  Extension: WFL  Abduction: WFL  External rotation BTH: WFL  Internal rotation BTB: WFL    Passive Range of Motion   Left Shoulder   Flexion: 160 degrees   Abduction: 180 degrees   External rotation 90°: 75 degrees   Internal rotation 90°: 60 degrees     Joint Play   Left Shoulder  Joints within functional limits are the posterior capsule and inferior capsule       Strength/Myotome Testing     Left Shoulder     Planes of Motion   Flexion: 5   Abduction: 5   External rotation at 0°: 5   Internal rotation at 0°: 5     Isolated Muscles   Biceps: 5   Triceps: 5     Right Shoulder     Planes of Motion   Flexion: 5   Extension: 5   Abduction: 5   External rotation at 0°: 5   Internal rotation at 0°: 5     Isolated Muscles   Biceps: 5   Triceps: 5          Daily Treatment Diary     DX: (L) Shoulder Adhesive Capsulitis   EPOC: 8/8/19  Precautions: standard  CO-MORBIDITES: NA  PERSONAL FACTORS:    Manual  6/25 7/1 7/3 7/8 7/11     (L) Sh' Mobs Post/ Inf 4'   Post/ Inf 4'  JR PT   Post/ Inf 4'     (L) Sh' PROM 6' 6' 12' 12' 4'     (L) Sh' IASTM          Dutch Zamudio) Sh' MRE - PNF                        Exercise Diary  HEP 7/1 7/3 7/8 7/11               Pulleys   2'/2' 2'/2' 2'/2' 2'/2'     UBE  3'/3' 3'/3' 3'/3'                Table Slides  Flex  ABD          Cane ER Stretch          [place] Sh' Ext Stretch                    Cane Flex Stretch          Posterior capsule stretch 6/10         Doorway Pec Stretch 6/10                   Std Sh' Flex  3#  20x 3#  20x 3#  20x      Std Sh' Scaption  3#  20x 3#  20x 3#  20x      Std Sh' ABD  3#  20x 3#  20x 3#  20x                TB Sh' Row 6/12 MTB &  OTB  20x MTB &  OTB  20x MTB &  OTB  20x MTB & BTB  20x     TB Sh' Ext 6/12 MTB &  OTB  20x MTB &  OTB  20x MTB &  OTB  20x MTB & BTB  20x     TB Triceps 6/12 MTB &  OTB  20x MTB &  OTB  20x MTB &  OTB  20x MTB & BTB  20x     TB Sh' ER 6/12 MTB  20x MTB  20x MTB  20x MTB  20x     TB Sh' IR 6/12 MTB  20x MTB  20x MTB  20x MTB  20x     TB b/l ER 6/12         TB Horz ABD 6/12 MTB  20 MTB  20x MTB  20x MTB   20x     TB PNF D1 Flex  BTB  20 BTB  20 BTB  20 BTB  20x     TB PNF D2 Flex  BTB  20 BTB  20 BTB  20 BTB  20x     Quadruped Shoulder Matrix                    Prone Sh' Ext  2# x20 3#  20x 3#  20x      Prone Sh' VB  2# x20 3#  20x 3#  20x      Prone Sh' ABD  2# x20 3#  20x 3#  20x      Prone Sh' VF  2# x20 3#  20x 3#  20x      Prone Sh' Flex  2# x20 3#  20x 3#  20x      S/L ER   3#  20x 3#  20x      S/L ABD   3#  20x 3#  20x      MD steering wheel    cw/ccw 10 ea      Body blade    35"x3      Flex bar OH    Red  1'          Modalities

## 2019-07-11 NOTE — PROGRESS NOTES
Daily Note     Today's date: 2019  Patient name: José Toledo  : 1970  MRN: [id number]  Referring provider: Rose Ireland MD  Dx:   Encounter Diagnosis     ICD-10-CM    1  TFCC (triangular fibrocartilage complex) tear, left, initial encounter S64 592A                   Subjective: I still get pain that  Limits function      Objective: See treatment diary below      Assessment: Tolerated treatment well  Patient has improved ROM to [facility]  He has pain with loaded flexion and extension  Plan: Continue per plan of care        Precautions: sx 3/12/19    Daily Treatment Diary     Manual  6/10 6/12 6/19 6/20 6/25 7/1 7/8 7/11     retrograde 4m 4m 5m 5m 5m 5m 3m 3m     Graston L wrist 4m 4m 5m 4m 4m 5m 4m 4m     Grade 2 MOB wrist 4m 4m  3m 3m 3m 3m   3m                                   Exercise Diary  6/10 6/12 6/19 6/20 6/25 7/1 7/8 7/11     Intrinsic stretch x10 x10 x10 x10 x10 x10 x10 x10     Extrinsic stretch x10 x10 x10 x10 x10 x10 x10 x10     Wrist ROM x10 Dowel 2x10  Dowel  2x10 Dowel  2x10        p/s flexbar green  3x10 flexbar  green 3x10 RFB 2x10 x10 x10 x10 x10 x10     Wrist maze  x5  x5 x5        Hook/fist              gripping Black 3x10 BPW 3x10 Black PW  3x10 GPW BPW  3x10  BPW  3x10 BPW  3x10     Pinch pins   w/sup Black 3x10 Black 3x10 black  3x10 Red  3x10 Black   3x10  Black  3x10 Black  3x10     Wrist e/f 3#  3x10 3#  3x10 1# 3x10 #2 3x10 2#  3x10  2#  3x10 3#  3x10     biceps       7 5KB  3x10                                                                                                                           HEP- TGEs, Wrist AROM reviewed                Modalities  6/10 6/12 6/19 6/20 6/25 7/1 7/8 7     MHP 10m    10m  10m      CP post  5m 5' 5m 5m  5m 5m     Cont US dw   8m 8' 8m    8m

## 2019-07-15 ENCOUNTER — OFFICE VISIT (OUTPATIENT)
Dept: OCCUPATIONAL THERAPY | Facility: CLINIC | Age: 49
End: 2019-07-15
Payer: COMMERCIAL

## 2019-07-15 ENCOUNTER — OFFICE VISIT (OUTPATIENT)
Dept: PHYSICAL THERAPY | Facility: CLINIC | Age: 49
End: 2019-07-15
Payer: COMMERCIAL

## 2019-07-15 DIAGNOSIS — M75.02 ADHESIVE CAPSULITIS OF LEFT SHOULDER: Primary | ICD-10-CM

## 2019-07-15 DIAGNOSIS — S63.592A TFCC (TRIANGULAR FIBROCARTILAGE COMPLEX) TEAR, LEFT, INITIAL ENCOUNTER: Primary | ICD-10-CM

## 2019-07-15 PROCEDURE — 97530 THERAPEUTIC ACTIVITIES: CPT

## 2019-07-15 PROCEDURE — 97110 THERAPEUTIC EXERCISES: CPT

## 2019-07-15 PROCEDURE — 97140 MANUAL THERAPY 1/> REGIONS: CPT

## 2019-07-15 PROCEDURE — 97530 THERAPEUTIC ACTIVITIES: CPT | Performed by: OCCUPATIONAL THERAPIST

## 2019-07-15 PROCEDURE — 97110 THERAPEUTIC EXERCISES: CPT | Performed by: OCCUPATIONAL THERAPIST

## 2019-07-15 NOTE — PROGRESS NOTES
Daily Note     Today's date: 7/15/2019  Patient name: Damon Espinoza  : 1970  MRN: [id number]  Referring provider: Yolanda Sahu MD  Dx:   Encounter Diagnosis     ICD-10-CM    1  TFCC (triangular fibrocartilage complex) tear, left, initial encounter [id number] 060K                   Subjective: I am sore      Objective: See treatment diary below      Assessment: Tolerated treatment well  Patient has pain at end range   He has improved strength   Plan: Continue per plan of care  Precautions: sx 3/12/19    Daily Treatment Diary     Manual  6/10 6/12 6/19 6/20 6/25 7/1 7/8 7/11 7/15    retrograde 4m 4m 5m 5m 5m 5m 3m 3m 3m    Graston L wrist 4m 4m 5m 4m 4m 5m 4m 4m 4m    Grade 2 MOB wrist 4m 4m  3m 3m 3m 3m   3m 3m                                  Exercise Diary  6/10 6/12 6/19 6/20 6/25 7/1 7/8 7/11 7/15    Intrinsic stretch x10 x10 x10 x10 x10 x10 x10 x10 x10    Extrinsic stretch x10 x10 x10 x10 x10 x10 x10 x10 x10    Wrist ROM x10 Dowel 2x10  Dowel  2x10 Dowel  2x10        p/s flexbar green  3x10 flexbar  green 3x10 RFB 2x10 x10 x10 x10 x10 x10 3x10    Wrist maze  x5  x5 x5        Hook/fist              gripping Black 3x10 BPW 3x10 Black PW  3x10 GPW BPW  3x10  BPW  3x10 BPW  3x10 BPW  3x10    Pinch pins   w/sup Black 3x10 Black 3x10 black  3x10 Red  3x10 Black   3x10  Black  3x10 Black  3x10 Black  3x10    Wrist e/f 3#  3x10 3#  3x10 1# 3x10 #2 3x10 2#  3x10  2#  3x10 3#  3x10 3#  3x10    biceps       7 5KB  3x10  7  5KB  3x10                                                                                                                         HEP- TGEs, Wrist AROM reviewed                Modalities  6/10 6/12 6/19 6/20 6/25 7/1 7/8 7/11 7/15    MHP 10m    10m  10m  10m    CP post  5m 5' 5m 5m  5m 5m 5m    Cont US dw   8m 8' 8m    8m

## 2019-07-15 NOTE — PROGRESS NOTES
Daily Note     Today's date: 7/15/2019  Patient name: Maggy Staples  : 1970  MRN: [id number]  Referring provider: Candy Abel MD  Dx:   Encounter Diagnosis     ICD-10-CM    1  Adhesive capsulitis of left shoulder M75 02                   Subjective: Pt  Stated he is doing well today  Objective: See treatment diary below      Assessment: Pt  Completed documented program  Tolerated overall progression to treatment well  Pt  ROM and strength continue to increase  Still has difficulty putting weight through L wrist (attempted weighted ball against wall but caused wrist pain)  Patient demonstrated fatigue post treatment, exhibited good technique with therapeutic exercises and would benefit from continued PT  Plan: Continue per plan of care  Progress treatment as tolerated           Daily Treatment Diary     DX: (L) Shoulder Adhesive Capsulitis   EPOC: 19  Precautions: standard  CO-MORBIDITES: NA  PERSONAL FACTORS:    Manual  6/25 7/1 7/3 7/8 7/11 7/15    (L) Sh' Mobs Post/ Inf 4'   Post/ Inf 4'  JR PT   Post/ Inf 4'     (L) Sh' PROM 6' 6' 12' 12' 4' 8'    (L) Sh' IASTM          Cleaster Ran) Sh' MRE - PNF                        Exercise Diary  HEP 7/1 7/3 7/8 7/11 7/15              Pulleys   2'/2' 2'/2' 2'/2' 2'/2' 2'/2'    UBE  3'/3' 3'/3' 3'/3'  3'/3'              Table Slides  Flex  ABD          Cane ER Stretch          [place] Sh' Ext Stretch                    Cane Flex Stretch          Posterior capsule stretch 6/10         Doorway Pec Stretch 6/10                   Std Sh' Flex  3#  20x 3#  20x 3#  20x  4#  10x2    Std Sh' Scaption  3#  20x 3#  20x 3#  20x  4#  10x2    Std Sh' ABD  3#  20x 3#  20x 3#  20x  4#  10x2              TB Sh' Row  MTB &  OTB  20x MTB &  OTB  20x MTB &  OTB  20x MTB & BTB  20x MTB & BTB  20x    TB Sh' Ext  MTB &  OTB  20x MTB &  OTB  20x MTB &  OTB  20x MTB & BTB  20x MTB & BTB  20x    TB Triceps  MTB &  OTB  20x MTB &  OTB  20x MTB &  OTB  20x MTB & BTB  20x MTB & BTB  20x    TB Sh' ER 6/12 MTB  20x MTB  20x MTB  20x MTB  20x MTB  20x    TB Sh' IR 6/12 MTB  20x MTB  20x MTB  20x MTB  20x MTB  20x    TB b/l ER 6/12         TB Horz ABD 6/12 MTB  20 MTB  20x MTB  20x MTB   20x MTB  20x    TB PNF D1 Flex  BTB  20 BTB  20 BTB  20 BTB  20x BTB  20x    TB PNF D2 Flex  BTB  20 BTB  20 BTB  20 BTB  20x BTB  20x    Quadruped Shoulder Matrix                    Prone Sh' Ext  2# x20 3#  20x 3#  20x  4#  10x2    Prone Sh' VB  2# x20 3#  20x 3#  20x  4#  10x2    Prone Sh' ABD  2# x20 3#  20x 3#  20x  4#  10x2    Prone Sh' VF  2# x20 3#  20x 3#  20x  4#  10x2    Prone Sh' Flex  2# x20 3#  20x 3#  20x  4#  10x2    S/L ER   3#  20x 3#  20x  4#  10x2    S/L ABD   3#  20x 3#  20x    4#  10x2    IL steering wheel    cw/ccw 10 ea  cw/ccw 10 ea    Body blade    35"x3  45"x3    Flex bar OH    Red  1'  Red  1'        Modalities

## 2019-07-18 ENCOUNTER — OFFICE VISIT (OUTPATIENT)
Dept: PHYSICAL THERAPY | Facility: CLINIC | Age: 49
End: 2019-07-18
Payer: COMMERCIAL

## 2019-07-18 ENCOUNTER — OFFICE VISIT (OUTPATIENT)
Dept: OCCUPATIONAL THERAPY | Facility: CLINIC | Age: 49
End: 2019-07-18
Payer: COMMERCIAL

## 2019-07-18 DIAGNOSIS — M75.02 ADHESIVE CAPSULITIS OF LEFT SHOULDER: Primary | ICD-10-CM

## 2019-07-18 DIAGNOSIS — S63.592A TFCC (TRIANGULAR FIBROCARTILAGE COMPLEX) TEAR, LEFT, INITIAL ENCOUNTER: Primary | ICD-10-CM

## 2019-07-18 PROCEDURE — 97140 MANUAL THERAPY 1/> REGIONS: CPT

## 2019-07-18 PROCEDURE — 97140 MANUAL THERAPY 1/> REGIONS: CPT | Performed by: OCCUPATIONAL THERAPIST

## 2019-07-18 PROCEDURE — 97110 THERAPEUTIC EXERCISES: CPT | Performed by: OCCUPATIONAL THERAPIST

## 2019-07-18 PROCEDURE — 97110 THERAPEUTIC EXERCISES: CPT

## 2019-07-18 PROCEDURE — 97530 THERAPEUTIC ACTIVITIES: CPT

## 2019-07-18 NOTE — PROGRESS NOTES
Daily Note     Today's date: 2019  Patient name: Frantz Donahue  : 1970  MRN: [id number]  Referring provider: Rob Westfall MD  Dx:   Encounter Diagnosis     ICD-10-CM    1  TFCC (triangular fibrocartilage complex) tear, left, initial encounter S63 592A                   Subjective: I am vacation on next week  Objective: See treatment diary below      Assessment: Tolerated treatment well  Patient has regained ROM   He has mild pain at end range  Plan: Continue per plan of care  Daily Treatment Diary      Precautions: sx 3/12/19    Daily Treatment Diary     Manual  6/10 6/12 6/19 6/20 6/25 7/1 7/8 7/11 7/15 7/18   retrograde 4m 4m 5m 5m 5m 5m 3m 3m 3m 3m   Graston L wrist 4m 4m 5m 4m 4m 5m 4m 4m 4m 4m   Grade 2 MOB wrist 4m 4m  3m 3m 3m 3m   3m 3m 3m                                 Exercise Diary  6/10 6/12 6/19 6/20 6/25 7/1 7/8 7/11 7/15 7/18   Intrinsic stretch x10 x10 x10 x10 x10 x10 x10 x10 x10 x10   Extrinsic stretch x10 x10 x10 x10 x10 x10 x10 x10 x10 x10   Wrist ROM x10 Dowel 2x10  Dowel  2x10 Dowel  2x10        p/s flexbar green  3x10 flexbar  green 3x10 RFB 2x10 x10 x10 x10 x10 x10 3x10 3x10   Wrist maze  x5  x5 x5        Hook/fist              gripping Black 3x10 BPW 3x10 Black PW  3x10 GPW BPW  3x10  BPW  3x10 BPW  3x10 BPW  3x10 BPW  3x10   Pinch pins   w/sup Black 3x10 Black 3x10 black  3x10 Red  3x10 Black   3x10  Black  3x10 Black  3x10 Black  3x10 Black  3x10   Wrist e/f 3#  3x10 3#  3x10 1# 3x10 #2 3x10 2#  3x10  2#  3x10 3#  3x10 3#  3x10 3#  3x10   biceps       7 5KB  3x10  7  5KB  3x10 10#  1x10                                                                                                                        HEP- TGEs, Wrist AROM reviewed                Modalities  6/10 6/12 6/19 6/20 6/25 7/1 7/8 7/11 7/15 7/18   MHP 10m    10m  10m  10m 10m   CP post  5m 5' 5m 5m  5m 5m 5m 5m   Cont US dw   8m 8' 8m    8m

## 2019-07-18 NOTE — PROGRESS NOTES
Daily Note     Today's date: 2019  Patient name: Cristopher Severin  : 1970  MRN: [id number]  Referring provider: Onofre Bravo MD  Dx:   Encounter Diagnosis     ICD-10-CM    1  Adhesive capsulitis of left shoulder M75 02                   Subjective: Pt  Stated he is doing well today  Objective: See treatment diary below      Assessment: Pt  Completed documented program  Continues to tolerate overall progression to treatment well  Pt  ROM and strength continue to increase post PT mobs  Still has difficulty putting weight through L wrist  All other strengthening TE's going well  Patient demonstrated fatigue post treatment, exhibited good technique with therapeutic exercises and would benefit from continued PT  Plan: Continue per plan of care  Progress treatment as tolerated           Daily Treatment Diary     DX: (L) Shoulder Adhesive Capsulitis   EPOC: 19  Precautions: standard  CO-MORBIDITES: NA  PERSONAL FACTORS:    Manual  6/25 7/1 7/3 7/8 7/11 7/15 7/18   (L) Sh' Mobs Post/ Inf 4'   Post/ Inf 4'  JR PT   Post/ Inf 4'  JA  PT   (L) Sh' PROM 6' ' ' 12' 4' 8' 8'   (L) Sh' IASTM          Gerald Gum) Sh' MRE - PNF                        Exercise Diary  HEP 7/1 7/3 7/8 7/11 7/15 7/18             Pulleys   2'/2' 2'/2' 2'/2' 2'/2' 2'/2' 2'/2'   UBE  3'/3' 3'/3' 3'/3'  3'/3' 3'/3'             Table Slides  Flex  ABD          Cane ER Stretch          [place] Sh' Ext Stretch                    Cane Flex Stretch          Posterior capsule stretch 6/10         Doorway Pec Stretch 6/10                   Std Sh' Flex  3#  20x 3#  20x 3#  20x  4#  10x2 4#  10x2   Std Sh' Scaption  3#  20x 3#  20x 3#  20x  4#  10x2 4#  10x2   Std Sh' ABD  3#  20x 3#  20x 3#  20x  4#  10x2 4#  10x2             TB Sh' Row  MTB &  OTB  20x MTB &  OTB  20x MTB &  OTB  20x MTB & BTB  20x MTB & BTB  20x MTB & BTB  20x   TB Sh' Ext  MTB &  OTB  20x MTB &  OTB  20x MTB &  OTB  20x MTB & BTB  20x MTB & BTB  20x MTB & BTB  20x   TB Triceps 6/12 MTB &  OTB  20x MTB &  OTB  20x MTB &  OTB  20x MTB & BTB  20x MTB & BTB  20x MTB & BTB  20x   TB Sh' ER 6/12 MTB  20x MTB  20x MTB  20x MTB  20x MTB  20x MTB  20x   TB Sh' IR 6/12 MTB  20x MTB  20x MTB  20x MTB  20x MTB  20x MTB  20x   TB b/l ER 6/12         TB Horz ABD 6/12 MTB  20 MTB  20x MTB  20x MTB   20x MTB  20x MTB  20x   TB PNF D1 Flex  BTB  20 BTB  20 BTB  20 BTB  20x BTB  20x BTB  20x   TB PNF D2 Flex  BTB  20 BTB  20 BTB  20 BTB  20x BTB  20x BTB  20x   Quadruped Shoulder Matrix                    Prone Sh' Ext  2# x20 3#  20x 3#  20x  4#  10x2 4#  10x2   Prone Sh' VB  2# x20 3#  20x 3#  20x  4#  10x2 4#  10x2   Prone Sh' ABD  2# x20 3#  20x 3#  20x  4#  10x2 4#  10x2   Prone Sh' VF  2# x20 3#  20x 3#  20x  4#  10x2 4#  10x2   Prone Sh' Flex  2# x20 3#  20x 3#  20x  4#  10x2 4#  10x2   S/L ER   3#  20x 3#  20x  4#  10x2 4#  10x2   S/L ABD   3#  20x 3#  20x    4#  10x2 4#  10x2   WI steering wheel    cw/ccw 10 ea  cw/ccw 10 ea cw/ccw 10 ea   Body blade    35"x3  45"x3 50"x3   Flex bar OH    Red  1'  Red  1' Red  1'       Modalities

## 2019-07-24 ENCOUNTER — APPOINTMENT (OUTPATIENT)
Dept: PHYSICAL THERAPY | Facility: CLINIC | Age: 49
End: 2019-07-24
Payer: COMMERCIAL

## 2019-07-24 ENCOUNTER — APPOINTMENT (OUTPATIENT)
Dept: OCCUPATIONAL THERAPY | Facility: CLINIC | Age: 49
End: 2019-07-24
Payer: COMMERCIAL

## 2019-08-02 ENCOUNTER — OFFICE VISIT (OUTPATIENT)
Dept: OCCUPATIONAL THERAPY | Facility: CLINIC | Age: 49
End: 2019-08-02
Payer: COMMERCIAL

## 2019-08-02 ENCOUNTER — OFFICE VISIT (OUTPATIENT)
Dept: PHYSICAL THERAPY | Facility: CLINIC | Age: 49
End: 2019-08-02
Payer: COMMERCIAL

## 2019-08-02 DIAGNOSIS — M75.02 ADHESIVE CAPSULITIS OF LEFT SHOULDER: Primary | ICD-10-CM

## 2019-08-02 DIAGNOSIS — S63.592A TFCC (TRIANGULAR FIBROCARTILAGE COMPLEX) TEAR, LEFT, INITIAL ENCOUNTER: Primary | ICD-10-CM

## 2019-08-02 PROCEDURE — 97140 MANUAL THERAPY 1/> REGIONS: CPT | Performed by: PHYSICAL THERAPIST

## 2019-08-02 PROCEDURE — 97530 THERAPEUTIC ACTIVITIES: CPT | Performed by: OCCUPATIONAL THERAPIST

## 2019-08-02 PROCEDURE — 97140 MANUAL THERAPY 1/> REGIONS: CPT | Performed by: OCCUPATIONAL THERAPIST

## 2019-08-02 PROCEDURE — 97112 NEUROMUSCULAR REEDUCATION: CPT | Performed by: PHYSICAL THERAPIST

## 2019-08-02 PROCEDURE — 97110 THERAPEUTIC EXERCISES: CPT | Performed by: OCCUPATIONAL THERAPIST

## 2019-08-02 PROCEDURE — 97110 THERAPEUTIC EXERCISES: CPT | Performed by: PHYSICAL THERAPIST

## 2019-08-02 NOTE — PROGRESS NOTES
Daily Note     Today's date: 2019  Patient name: José Toledo  : 1970  MRN: [id number]  Referring provider: Rose Ireland MD  Dx:   Encounter Diagnosis     ICD-10-CM    1  TFCC (triangular fibrocartilage complex) tear, left, initial encounter S63 592A                   Subjective: I was away for a week  Objective: See treatment diary below      Assessment: Tolerated treatment well  Patient has regained ROM   He has mild pain at end range  Plan: Continue per plan of care        Daily Treatment Diary      Precautions: sx 3/12/19    Daily Treatment Diary     Manual              retrograde 4m            Graston L wrist 4m            Grade 2 MOB wrist 4m                                          Exercise Diary              Intrinsic stretch x10            Extrinsic stretch x10            Wrist ROM x10            p/s flexbar green  3x10            Wrist maze             Hook/fist             gripping Black 3x10            Pinch pins   w/sup Black 3x10            Wrist e/f 3#  3x10            biceps #10 1x10                                                                                                                                 HEP- TGEs, Wrist AROM                 Modalities              MHP 10m            CP post 5m            Cont US dw

## 2019-08-02 NOTE — PROGRESS NOTES
Daily Note     Today's date: 2019  Patient name: Melba Ash  : 1970  MRN: [id number]  Referring provider: Helen Landry MD  Dx:   Encounter Diagnosis     ICD-10-CM    1  Adhesive capsulitis of left shoulder M75 02                   Subjective: Patient reports no new complaints  He has been progressing well with shoulder ROM but feels limited primarily with function secondary to wrist and hand  Objective: See treatment diary below      Assessment: Patient completed documented program   Patient demonstrated improved motion following manual treatment  He was able to tolerate wall pushups well  Patient demonstrated good exercise form throughout treatment  He was fatigued post treatment but had no shoulder pain  Plan: Continue per plan of care  Progress treatment as tolerated           Daily Treatment Diary     DX: (L) Shoulder Adhesive Capsulitis   EPOC: 19  Precautions: standard  CO-MORBIDITES: NA  PERSONAL FACTORS:    Manual  7/8 7/11 7/15 7/18 8/2     (L) Sh' Mobs  Post/ Inf 4'  JA  PT Post, inf 2 min     (L) Sh' PROM 12' 4' 8' 8' 6'     (L) Sh' IASTM          Vinny Yuen) [place]' MRE - PNF                        Exercise Diary  HEP 7/8 7/11 7/15 7/18 8/2              Pulleys   2'/2' 2'/2' 2'/2' 2'/2'     UBE  3'/3'  3'/3' 3'/3' 3'/3'              Table Slides  Flex  ABD          Cane ER Stretch          [place] Faden Sh' Ext Stretch                    Cane Flex Stretch          Posterior capsule stretch 6/10         Doorway Pec Stretch 6/10                   Std Sh' Flex  3#  20x  4#  10x2 4#  10x2 4#  10x2    Std Sh' Scaption  3#  20x  4#  10x2 4#  10x2 4#  10x2    Std Sh' ABD  3#  20x  4#  10x2 4#  10x2 4#  10x2              TB Sh' Row  MTB &  OTB  20x MTB & BTB  20x MTB & BTB  20x MTB & BTB  20x MTB & BTB  20x    TB Sh' Ext  MTB &  OTB  20x MTB & BTB  20x MTB & BTB  20x MTB & BTB  20x     TB Triceps  MTB &  OTB  20x MTB & BTB  20x MTB & BTB  20x MTB & BTB  20x     TB Sh' ER  MTB  20x MTB  20x MTB  20x MTB  20x     TB Sh' IR 6/12 MTB  20x MTB  20x MTB  20x MTB  20x     TB b/l ER 6/12         TB Horz ABD 6/12 MTB  20x MTB   20x MTB  20x MTB  20x     TB PNF D1 Flex  BTB  20 BTB  20x BTB  20x BTB  20x MTB  20x    TB PNF D2 Flex  BTB  20 BTB  20x BTB  20x BTB  20x MTB  20x    Quadruped Shoulder Matrix          Wall Push Ups       15x                                  Prone Sh' Ext  3#  20x  4#  10x2 4#  10x2 4#   10x2    Prone Sh' VB  3#  20x  4#  10x2 4#  10x2 4#   10x2    Prone Sh' ABD  3#  20x  4#  10x2 4#  10x2 4#   10x2    Prone Sh' VF  3#  20x  4#  10x2 4#  10x2 4#   10x2    Prone Sh' Flex  3#  20x  4#  10x2 4#  10x2 4#   10x2    S/L ER  3#  20x  4#  10x2 4#  10x2     S/L ABD  3#  20x    4#  10x2 4#  10x2     AR steering wheel  cw/ccw 10 ea  cw/ccw 10 ea cw/ccw 10 ea     Body blade  35"x3  45"x3 50"x3 2 Way  30"x3    Flex bar OH  Red  1'  Red  1' Red  1'         Modalities

## 2019-08-05 ENCOUNTER — APPOINTMENT (OUTPATIENT)
Dept: OCCUPATIONAL THERAPY | Facility: CLINIC | Age: 49
End: 2019-08-05
Payer: COMMERCIAL

## 2019-08-05 ENCOUNTER — APPOINTMENT (OUTPATIENT)
Dept: PHYSICAL THERAPY | Facility: CLINIC | Age: 49
End: 2019-08-05
Payer: COMMERCIAL

## 2019-08-08 ENCOUNTER — OFFICE VISIT (OUTPATIENT)
Dept: OCCUPATIONAL THERAPY | Facility: CLINIC | Age: 49
End: 2019-08-08
Payer: COMMERCIAL

## 2019-08-08 ENCOUNTER — OFFICE VISIT (OUTPATIENT)
Dept: PHYSICAL THERAPY | Facility: CLINIC | Age: 49
End: 2019-08-08
Payer: COMMERCIAL

## 2019-08-08 DIAGNOSIS — M75.02 ADHESIVE CAPSULITIS OF LEFT SHOULDER: Primary | ICD-10-CM

## 2019-08-08 DIAGNOSIS — S63.592A TFCC (TRIANGULAR FIBROCARTILAGE COMPLEX) TEAR, LEFT, INITIAL ENCOUNTER: Primary | ICD-10-CM

## 2019-08-08 PROCEDURE — 97140 MANUAL THERAPY 1/> REGIONS: CPT

## 2019-08-08 PROCEDURE — 97110 THERAPEUTIC EXERCISES: CPT | Performed by: OCCUPATIONAL THERAPIST

## 2019-08-08 PROCEDURE — 97530 THERAPEUTIC ACTIVITIES: CPT | Performed by: OCCUPATIONAL THERAPIST

## 2019-08-08 PROCEDURE — 97530 THERAPEUTIC ACTIVITIES: CPT

## 2019-08-08 PROCEDURE — 97110 THERAPEUTIC EXERCISES: CPT

## 2019-08-08 NOTE — PROGRESS NOTES
Daily Note     Today's date: 2019  Patient name: Felipe Bowling  : 1970  MRN: [id number]  Referring provider: Marcello Rollins MD  Dx:   Encounter Diagnosis     ICD-10-CM    1  Adhesive capsulitis of left shoulder M75 02                   Subjective: Patient reports no new complaints  He continues to progress with shoulder but feels wrist is holding him back       Objective: See treatment diary below      Assessment: Patient completed documented program   Patient demonstrated improved motion following manual treatment in IR which is now his only restrictionl  Continues to progress with shoulder strengthening with no pain throughout session  Wrist still limits WB activities but able to tolerate wall push ups  Plan: Continue per plan of care  Progress treatment as tolerated           Daily Treatment Diary     DX: (L) Shoulder Adhesive Capsulitis   EPOC: 19  Precautions: standard  CO-MORBIDITES: NA  PERSONAL FACTORS:    Manual  7/8 7/11 7/15 7/18 8/2 8/8    (L) Sh' Mobs  Post/ Inf 4'  JA  PT Post, inf 2 min     (L) Sh' PROM 12' 4' 8' 8' 6' 4'    (L) Sh' IASTM          Tomasa Cote) Sh' MRE - PNF      4' +[place]                  Exercise Diary  HEP 7/8 7/11 7/15 7/18 8/2 8/8             Pulleys   2'/2' 2'/2' 2'/2' 2'/2'  2'/2'   UBE  3'/3'  3'/3' 3'/3' 3'/3' 3'/3'             Table Slides  Flex  ABD          Cane ER Stretch          Mickiel Kussmaul Sh' Ext Stretch                    Cane Flex Stretch          Posterior capsule stretch 6/10         Doorway Pec Stretch 6/10                   Std Sh' Flex  3#  20x  4#  10x2 4#  10x2 4#  10x2 np   Std Sh' Scaption  3#  20x  4#  10x2 4#  10x2 4#  10x2 np   Std Sh' ABD  3#  20x  4#  10x2 4#  10x2 4#  10x2 np             TB Sh' Row  MTB &  OTB  20x MTB & BTB  20x MTB & BTB  20x MTB & BTB  20x MTB & BTB  20x MTB & BTB  20x   TB Sh' Ext  MTB &  OTB  20x MTB & BTB  20x MTB & BTB  20x MTB & BTB  20x  MTB & BTB  20x   TB Triceps  MTB &  OTB  20x MTB & BTB  20x MTB & BTB  20x MTB & BTB  20x  MTB & BTB  20x   TB Sh' ER 6/12 MTB  20x MTB  20x MTB  20x MTB  20x  MTB  20x   TB Sh' IR 6/12 MTB  20x MTB  20x MTB  20x MTB  20x  MTB  20x   TB b/l ER 6/12         TB Horz ABD 6/12 MTB  20x MTB   20x MTB  20x MTB  20x  MTB  20x   TB PNF D1 Flex  BTB  20 BTB  20x BTB  20x BTB  20x MTB  20x MTB  20x   TB PNF D2 Flex  BTB  20 BTB  20x BTB  20x BTB  20x MTB  20x MTB  20x   Quadruped Shoulder Matrix          Wall Push Ups       15x 15                                 Prone Sh' Ext  3#  20x  4#  10x2 4#  10x2 4#   10x2 5# 10x2   Prone Sh' VB  3#  20x  4#  10x2 4#  10x2 4#   10x2 5# 10x2   Prone Sh' ABD  3#  20x  4#  10x2 4#  10x2 4#   10x2 5# 10x2   Prone Sh' VF  3#  20x  4#  10x2 4#  10x2 4#   10x2 5# 10x2   Prone Sh' Flex  3#  20x  4#  10x2 4#  10x2 4#   10x2 5# 10x2   S/L ER  3#  20x  4#  10x2 4#  10x2  5# 10x2   S/L ABD  3#  20x    4#  10x2 4#  10x2  5# 10x2   NM steering wheel  cw/ccw 10 ea  cw/ccw 10 ea cw/ccw 10 ea  cw/ccw 10 ea   Body blade  35"x3  45"x3 50"x3 2 Way  30"x3 3 way  1' ea   Flex bar OH  Red  1'  Red  1' Red  1'     Supine flies       5# x20   Supine press       5# x20   Supine Lat press       10# KB  x20       Modalities

## 2019-08-08 NOTE — PROGRESS NOTES
Daily Note     Today's date: 2019  Patient name: Carlos Sarkar  : 1970  MRN: [id number]  Referring provider: Bhanu Bustillo MD  Dx:   Encounter Diagnosis     ICD-10-CM    1  TFCC (triangular fibrocartilage complex) tear, left, initial encounter S63 592A                   Subjective: I am don't feel ready to work full duty     Objective: See treatment diary below      Assessment: Tolerated treatment well  Patient is making steady gains  Plan: Continue per plan of care        Daily Treatment Diary     DX: (L) Shoulder Adhesive Capsulitis   EPOC: 19  Precautions: standard  CO-MORBIDITES: NA  PERSONAL FACTORS:    Precautions: sx 3/12/19    Daily Treatment Diary     Manual             retrograde 4m 4m           Graston L wrist 4m 4m           Grade 2 MOB wrist 4m 4m                                         Exercise Diary             Intrinsic stretch x10 x10           Extrinsic stretch x10 x10           Wrist ROM x10 x10           p/s flexbar green  3x10 flexbar  Green  3x10           Wrist maze             Hook/fist             gripping Black 3x10 Black  3x10           Pinch pins   w/sup Black 3x10 Black  Pinch pins  3x10           Wrist e/f 3#  3x10 4#  3x10           biceps #10 1x10 10#  1x10                                                                                                                                HEP- TGEs, Wrist AROM                 Modalities             MHP 10m 10m           CP post 5m 5m           Cont US dw

## 2019-08-21 ENCOUNTER — APPOINTMENT (OUTPATIENT)
Dept: OCCUPATIONAL THERAPY | Facility: CLINIC | Age: 49
End: 2019-08-21
Payer: COMMERCIAL

## 2019-08-21 ENCOUNTER — APPOINTMENT (OUTPATIENT)
Dept: PHYSICAL THERAPY | Facility: CLINIC | Age: 49
End: 2019-08-21
Payer: COMMERCIAL

## 2019-08-26 ENCOUNTER — OFFICE VISIT (OUTPATIENT)
Dept: OCCUPATIONAL THERAPY | Facility: CLINIC | Age: 49
End: 2019-08-26
Payer: COMMERCIAL

## 2019-08-26 ENCOUNTER — OFFICE VISIT (OUTPATIENT)
Dept: PHYSICAL THERAPY | Facility: CLINIC | Age: 49
End: 2019-08-26
Payer: COMMERCIAL

## 2019-08-26 DIAGNOSIS — S63.592A TFCC (TRIANGULAR FIBROCARTILAGE COMPLEX) TEAR, LEFT, INITIAL ENCOUNTER: Primary | ICD-10-CM

## 2019-08-26 DIAGNOSIS — M75.02 ADHESIVE CAPSULITIS OF LEFT SHOULDER: Primary | ICD-10-CM

## 2019-08-26 PROCEDURE — 97110 THERAPEUTIC EXERCISES: CPT | Performed by: OCCUPATIONAL THERAPIST

## 2019-08-26 PROCEDURE — 97530 THERAPEUTIC ACTIVITIES: CPT | Performed by: OCCUPATIONAL THERAPIST

## 2019-08-26 PROCEDURE — 97140 MANUAL THERAPY 1/> REGIONS: CPT

## 2019-08-26 PROCEDURE — 97110 THERAPEUTIC EXERCISES: CPT

## 2019-08-26 NOTE — PROGRESS NOTES
Daily Note     Today's date: 2019  Patient name: Tracie Cutler  : 1970  MRN: [id number]  Referring provider: Yeimi Dallas MD  Dx:   Encounter Diagnosis     ICD-10-CM    1  TFCC (triangular fibrocartilage complex) tear, left, initial encounter S63 592A                   Subjective: I am doing OK      Objective: See treatment diary below      Assessment: Tolerated treatment well  Patient has improved strength   He deinies pain         Plan: upgrade     Daily Treatment Diary     DX: (L) Shoulder Adhesive Capsulitis   EPOC: 19  Precautions: standard  CO-MORBIDITES: NA         Daily Treatment Diary     Manual            retrograde 4m 4m 4m          Graston L wrist 4m 4m 4m          Grade 2 MOB wrist 4m 4m 4m                                        Exercise Diary            Intrinsic stretch x10 x10 x10          Extrinsic stretch x10 x10 x10          Wrist ROM x10 x10 x10          p/s flexbar green  3x10 flexbar  Green  3x10 flexbar green  3x10          Wrist maze             Hook/fist             gripping Black 3x10 Black  3x10 Black  3x10          Pinch pins   w/sup Black 3x10 Black  Pinch pins  3x10 Black  3x10          Wrist e/f 3#  3x10 4#  3x10 4#  3x10          biceps #10 1x10 10#  1x10 12#  2x10          Cable Triceps    55#  3x10          Cable ROW   55#  3x10                                                                                                     HEP- TGEs, Wrist AROM                 Modalities            MHP 10m 10m 10m          CP post 5m 5m 5m          Cont US dw

## 2019-08-26 NOTE — PROGRESS NOTES
Daily Note     Today's date: 2019  Patient name: Riki Messer  : 1970  MRN: [id number]  Referring provider: Hector Dewitt MD  Dx:   Encounter Diagnosis     ICD-10-CM    1  Adhesive capsulitis of left shoulder M75 02                   Subjective: Patient reports no new complaints  Objective: See treatment diary below      Assessment: Patient completed documented program   Patient demonstrated improved motion following manual treatment in IR which is now his only restrictionl  Continues to progress with shoulder strengthening with no pain throughout session  WB activities through wrist have also improved and he is now able to do a few push ups and is bench pressing at home  Plan: Continue per plan of care  Progress treatment as tolerated           Daily Treatment Diary     DX: (L) Shoulder Adhesive Capsulitis   EPOC: 19  Precautions: standard  CO-MORBIDITES: NA  PERSONAL FACTORS:    Manual           (L) Sh' Mobs KK PT         (L) Sh' PROM 5'         (L) Sh' IASTM          (L) Sh' MRE - PNF                        Exercise Diary  HEP                   Pulleys           UBE  3'/3'                  Table Slides  Flex  ABD          Cane ER Stretch          Raford Revels Sh' Ext Stretch                    Cane Flex Stretch          Posterior capsule stretch 6/10         Doorway Pec Stretch 6/10                   Std Sh' Flex  5#  10x2        Std Sh' Scaption  5#  10x2        Std Sh' ABD  5#  10x2                  TB Sh' Row / MTB & BTB  20x        TB Sh' Ext / MTB & BTB  20x        TB Triceps / MTB & BTB  20x        TB Sh' ER 6/12 MTB  20x        TB Sh' IR / MTB  20x        TB b/l ER / MTB  20x        TB Horz ABD / MTB  20x        TB PNF D1 Flex  MTB  20x        TB PNF D2 Flex  MTB  20x        Quadruped Shoulder Matrix          Wall Push Ups                                         Prone Sh' Ext  5# 10x2        Prone Sh' VB  5# 10x2        Prone Sh' ABD  5# 10x2        Prone Sh' VF  5# 10x2        Prone Sh' Flex  5# 10x2        S/L ER  8# 10x2        S/L ABD  np        DE steering wheel  Cw/ccw  x40 ea        Body blade  30"x3 ea        Flex bar OH          Supine flies  8# 10x2        Supine press  8# 10x2        Supine Lat press  10# KB  x20            Modalities

## 2019-08-28 ENCOUNTER — APPOINTMENT (OUTPATIENT)
Dept: PHYSICAL THERAPY | Facility: CLINIC | Age: 49
End: 2019-08-28
Payer: COMMERCIAL

## 2019-08-28 ENCOUNTER — APPOINTMENT (OUTPATIENT)
Dept: OCCUPATIONAL THERAPY | Facility: CLINIC | Age: 49
End: 2019-08-28
Payer: COMMERCIAL

## 2019-08-30 ENCOUNTER — APPOINTMENT (OUTPATIENT)
Dept: PHYSICAL THERAPY | Facility: CLINIC | Age: 49
End: 2019-08-30
Payer: COMMERCIAL

## 2019-08-30 ENCOUNTER — APPOINTMENT (OUTPATIENT)
Dept: OCCUPATIONAL THERAPY | Facility: CLINIC | Age: 49
End: 2019-08-30
Payer: COMMERCIAL

## 2019-09-05 ENCOUNTER — OFFICE VISIT (OUTPATIENT)
Dept: OCCUPATIONAL THERAPY | Facility: CLINIC | Age: 49
End: 2019-09-05
Payer: COMMERCIAL

## 2019-09-05 ENCOUNTER — OFFICE VISIT (OUTPATIENT)
Dept: PHYSICAL THERAPY | Facility: CLINIC | Age: 49
End: 2019-09-05
Payer: COMMERCIAL

## 2019-09-05 DIAGNOSIS — M75.02 ADHESIVE CAPSULITIS OF LEFT SHOULDER: Primary | ICD-10-CM

## 2019-09-05 DIAGNOSIS — S63.592A TFCC (TRIANGULAR FIBROCARTILAGE COMPLEX) TEAR, LEFT, INITIAL ENCOUNTER: Primary | ICD-10-CM

## 2019-09-05 PROCEDURE — 97010 HOT OR COLD PACKS THERAPY: CPT | Performed by: OCCUPATIONAL THERAPIST

## 2019-09-05 PROCEDURE — 97140 MANUAL THERAPY 1/> REGIONS: CPT | Performed by: OCCUPATIONAL THERAPIST

## 2019-09-05 PROCEDURE — 97110 THERAPEUTIC EXERCISES: CPT | Performed by: OCCUPATIONAL THERAPIST

## 2019-09-05 PROCEDURE — 97530 THERAPEUTIC ACTIVITIES: CPT

## 2019-09-05 PROCEDURE — 97140 MANUAL THERAPY 1/> REGIONS: CPT

## 2019-09-05 PROCEDURE — 97110 THERAPEUTIC EXERCISES: CPT

## 2019-09-05 NOTE — PROGRESS NOTES
Daily Note     Today's date: 2019  Patient name: Maggy Staples  : 1970  MRN: [id number]  Referring provider: Candy Abel MD  Dx:   Encounter Diagnosis     ICD-10-CM    1  Adhesive capsulitis of left shoulder M75 02                   Subjective: Patient reports no new complaints  Objective: See treatment diary below      Assessment: Patient completed documented program   Patient demonstrated improved motion following manual treatment in IR/ER which is now his only restrictionl  Continues to progress with shoulder strengthening with no pain throughout session  WB activities through wrist have also improved and he is now able to do a few push ups and is bench pressing at home  Plan: Continue per plan of care  Progress treatment as tolerated           Daily Treatment Diary     DX: (L) Shoulder Adhesive Capsulitis   EPOC: 19  Precautions: standard  CO-MORBIDITES: NA  PERSONAL FACTORS:    Manual          (L) Sh' Mobs KK PT JA  PT        (L) Sh' PROM 5' 5'        (L) Sh' IASTM          Cleaster Ran) Sh' MRE - PNF                        Exercise Diary  HEP                  Pulleys           UBE  3'/3' 3'/3'                 Table Slides  Flex  ABD          Cane ER Stretch          Donte Daniel Sh' Ext Stretch                    Cane Flex Stretch          Posterior capsule stretch 6/10         Doorway Pec Stretch /10                   Std Sh' Flex  5#  10x2        Std Sh' Scaption  5#  10x2        Std Sh' ABD  5#  10x2                  TB Lat pull down   MTB & BTB  20x       TB Sh' Row / MTB & BTB  20x MTB & BTB  20x       TB Sh' Ext / MTB & BTB  20x MTB & BTB  20x       TB Triceps /12 MTB & BTB  20x MTB & BTB  20x       TB Sh' ER 6/12 MTB  20x MTB  20x       TB Sh' IR 6/12 MTB  20x MTB  20x       TB b/l ER 6/12 MTB  20x MTB  20x       TB Horz ABD 6/ MTB  20x MTB  20x       TB PNF D1 Flex  MTB  20x + EXT  x20 ea       TB PNF D2 Flex  MTB  20x +EXT  x20 ea       Quadruped Shoulder Matrix Wall Push Ups                                         Prone Sh' Ext  5# 10x2        Prone Sh' VB  5# 10x2        Prone Sh' ABD  5# 10x2        Prone Sh' VF  5# 10x2        Prone Sh' Flex  5# 10x2        S/L ER  8# 10x2        S/L ABD  np        CO steering wheel  Cw/ccw  x40 ea Cw/ccw  x40 ea       Body blade  30"x3 ea 30"x3 ea       Flex bar OH          Supine flies  8# 10x2        Supine press  8# 10x2        Supine Lat press  10# KB  x20 10# KB  x20           Modalities

## 2019-09-05 NOTE — PROGRESS NOTES
Daily Note     Today's date: 2019  Patient name: Kasie Guerin  : 1970  MRN: [id number]  Referring provider: Renée Longo MD  Dx:   Encounter Diagnosis     ICD-10-CM    1  TFCC (triangular fibrocartilage complex) tear, left, initial encounter S63 592A                   Subjective: I am getting better      Objective: See treatment diary below      Assessment: Tolerated treatment well  Patient is making steady gains with return of strength   Plan: Continue per plan of care        Daily Treatment Diary     DX: (L) Shoulder Adhesive Capsulitis   EPOC: 19  Precautions: standard  CO-MORBIDITES: NA  PERSONAL FACTORS:         Daily Treatment Diary     Manual   9/5         retrograde 4m 4m 4m 4m         Graston L wrist 4m 4m 4m 4m         Grade 3 MOB wrist 4m 4m 4m 4m                                       Exercise Diary   9/5         Intrinsic stretch x10 x10 x10 x10         Extrinsic stretch x10 x10 x10 x10         Wrist ROM x10 x10 x10 x10         p/s flexbar green  3x10 flexbar  Green  3x10 flexbar green  3x10 flexbar  Green  3x10         Wrist maze             Hook/fist             gripping Black 3x10 Black  3x10 Black  3x10 Black  3x10         Pinch pins   w/sup Black 3x10 Black  Pinch pins  3x10 Black  3x10 Black   3x10         Wrist e/f 3#  3x10 4#  3x10 4#  3x10 4#  3x10         biceps #10 1x10 10#  1x10 12#  2x10 12#  3x10         Cable Triceps    55#  3x10 65#  3x10         Cable ROW   55#  3x10 55#  3x10                                                                                                    HEP- TGEs, Wrist AROM                 Modalities            MHP 10m 10m 10m          CP post 5m 5m 5m          Cont US dw

## 2019-11-08 ENCOUNTER — CLINICAL SUPPORT (OUTPATIENT)
Dept: GASTROENTEROLOGY | Facility: CLINIC | Age: 49
End: 2019-11-08

## 2019-11-08 VITALS — WEIGHT: 155 LBS | HEIGHT: 69 IN | BODY MASS INDEX: 22.96 KG/M2

## 2019-11-08 DIAGNOSIS — Z12.11 SCREENING FOR COLON CANCER: Primary | ICD-10-CM

## 2021-04-09 DIAGNOSIS — Z23 ENCOUNTER FOR IMMUNIZATION: ICD-10-CM

## 2021-08-13 ENCOUNTER — HOSPITAL ENCOUNTER (OUTPATIENT)
Dept: CT IMAGING | Facility: HOSPITAL | Age: 51
Discharge: HOME | End: 2021-08-13
Attending: FAMILY MEDICINE
Payer: COMMERCIAL

## 2021-08-13 DIAGNOSIS — R31.0 GROSS HEMATURIA: ICD-10-CM

## 2021-08-13 DIAGNOSIS — R10.9 STOMACH ACHE: ICD-10-CM

## 2021-08-13 DIAGNOSIS — R30.0 DYSURIA: ICD-10-CM

## 2021-08-13 PROCEDURE — 74176 CT ABD & PELVIS W/O CONTRAST: CPT

## 2023-02-08 ENCOUNTER — EVALUATION (OUTPATIENT)
Dept: OCCUPATIONAL THERAPY | Facility: CLINIC | Age: 53
End: 2023-02-08

## 2023-02-08 DIAGNOSIS — Z98.890 STATUS POST DEBRIDEMENT: Primary | ICD-10-CM

## 2023-02-08 NOTE — PROGRESS NOTES
OT Evaluation     Today's date: 2023  Patient name: Daija Gates  : 1970  MRN: [id number]  Referring provider: Vanessa Amanda MD  Dx:   Encounter Diagnosis     ICD-10-CM    1  Status post debridement  Z98 890                      Assessment  Assessment details: Pt  Presents today for evaluation of the R wrist s/p debridement with limited R hand/wrist use  He has mild stiffness, extrinsic tightness, mild edema and pain with function  He is currently wearing a custom volar wrist splint for protection at all times, removing for HEP and hygiene  Pt  To benefit from skilled hand therapy to improve R hand function and return to work  RX for gentle ROM at this time  He is out of work with restrictions as a   Impairments: abnormal or restricted ROM, impaired physical strength, lacks appropriate home exercise program and pain with function  Functional limitations: Pt  is not working,  He is not lifting, pushing and pulling with his R hand  Pt  is L hand dominant  Understanding of Dx/Px/POC: good   Prognosis: good    Goals  STG( 3 visits)  1  Compliant with HEP/splint  2  Full Wrist AROM to WNLs for function  LTG( 6 visits or discharge)  1  R  strength >30lb or more for RTW  2  Pain free function with IADLs  3  Improve FOTO score to predicted outcome or greater      Plan  Patient would benefit from: skilled occupational therapy  Planned modality interventions: cryotherapy and thermotherapy: hydrocollator packs  Planned therapy interventions: joint mobilization, manual therapy, home exercise program, graded exercise, functional ROM exercises, therapeutic exercise, therapeutic activities, stretching, strengthening, fine motor coordination training and orthotic fitting/training  Frequency: 1x week  Duration in visits: 1  Duration in weeks: 6  Plan of Care beginning date: 2023  Plan of Care expiration date: 3/31/2023  Treatment plan discussed with: patient        Subjective Evaluation    History of Present Illness  Mechanism of injury: Pt  S/p R wrist debridement on 23 by [facility]  Pt  Was seen post operatively and placed in a Volar wrist splint  He returns to department for hand therapy to begin gentle ROM      Quality of life: good    Pain  Current pain ratin  At worst pain ratin  Quality: discomfort  Relieving factors: ice  Aggravating factors: lifting  Progression: improved    Social Support  Lives in: multiple-level home  Lives with: spouse    Employment status: working ()  Hand dominance: left    Treatments  Current treatment: occupational therapy  Patient Goals  Patient goals for therapy: decreased pain, increased motion, return to sport/leisure activities, return to work and increased strength          Objective     Tenderness     Additional Tenderness Details  TTP over dorsum of wrist     Neurological Testing     Sensation     Wrist/Hand     Right   Intact: light touch    Active Range of Motion     Right Elbow   Normal active range of motion    Right Wrist   Wrist flexion: 45 degrees   Wrist extension: 70 degrees   Radial deviation: 10 degrees with pain  Ulnar deviation: 40 degrees with pain    Right Thumb   Opposition: Full opposition    Additional Active Range of Motion Details  Full composite fist  Mild extrinsic tightness    Strength/Myotome Testing     Left Wrist/Hand      (2nd hand position)     Trial 1: 60    Thumb Strength  Key/Lateral Pinch     Trial 1: 17    Right Wrist/Hand   Wrist extension: 3+  Wrist flexion: 3+  Radial deviation: 3+  Ulnar deviation: 3+     (2nd hand position)     Trial 1: 20    Thumb Strength   Key/Lateral Pinch     Trial 1: 13    Swelling     Left Wrist/Hand   Circumference wrist: 16 cm    Right Wrist/Hand   Circumference wrist: 16 8 cm             Precautions: Universal sx- 23, ROM only      Manuals 2/8             IE 30'            Scar mob 3m            Graston L 4m            STM extensors 4m            Neuro Re-Ed                          HEP- wrist A/LAM JONAS reviewed                                                                             Ther Ex             Wrist PROM 2m            Wrist AROM                                                                                           Ther Activity                                       Gait Training                                       Modalities              R 10m

## 2023-02-08 NOTE — LETTER
2023    Von Quispe MD  [address] [place] [address]  Suite [facility] Abdi Navarro [facility] [address]    Patient: Dai Martinez   YOB: 1970   Date of Visit: 2023     Encounter Diagnosis     ICD-10-CM    1  Status post debridement  Z98 890           Dear Dr Leon Chambers: Thank you for your recent referral of Dai Martinez  Please review the attached evaluation summary from [address] [place] [address] recent visit  Please verify that you agree with the plan of care by signing the attached order  If you have any questions or concerns, please do not hesitate to call  I sincerely appreciate the opportunity to share in the care of one of your patients and hope to have another opportunity to work with you in the near future  Sincerely,    Alexsander Martinez, OT      Referring Provider:     I certify that I have read the below Plan of Care and certify the need for these services furnished under this plan of treatment while under my care  Von Quispe MD  [address] [place] [address]  Via Fax: [phone]        OT Evaluation     Today's date: 2023  Patient name: Dia Martinez  : 1970  MRN: [id number]  Referring provider: Ramírez Barillas MD  Dx:   Encounter Diagnosis     ICD-10-CM    1  Status post debridement  Z98 [id number]                      Assessment  Assessment details: Pt  Presents today for evaluation of the R wrist s/p debridement with limited R hand/wrist use  He has mild stiffness, extrinsic tightness, mild edema and pain with function  He is currently wearing a custom volar wrist splint for protection at all times, removing for HEP and hygiene  Pt  To benefit from skilled hand therapy to improve R hand function and return to work  RX for gentle ROM at this time  He is out of work with restrictions as a     Impairments: abnormal or restricted ROM, impaired physical strength, lacks appropriate home exercise program and pain with function  Functional limitations: Pt  is not working,  He is not lifting, pushing and pulling with his R hand  Pt  is L hand dominant  Understanding of Dx/Px/POC: good   Prognosis: good    Goals  STG( 3 visits)  1  Compliant with HEP/splint  2  Full Wrist AROM to WNLs for function  LTG( 6 visits or discharge)  1  R  strength >30lb or more for RTW  2  Pain free function with IADLs  3  Improve FOTO score to predicted outcome or greater  Plan  Patient would benefit from: skilled occupational therapy  Planned modality interventions: cryotherapy and thermotherapy: hydrocollator packs  Planned therapy interventions: joint mobilization, manual therapy, home exercise program, graded exercise, functional ROM exercises, therapeutic exercise, therapeutic activities, stretching, strengthening, fine motor coordination training and orthotic fitting/training  Frequency: 1x week  Duration in visits: 1  Duration in weeks: 6  Plan of Care beginning date: 2023  Plan of Care expiration date: 3/31/2023  Treatment plan discussed with: patient        Subjective Evaluation    History of Present Illness  Mechanism of injury: Pt  S/p R wrist debridement on 23 by [facility]  Pt  Was seen post operatively and placed in a Volar wrist splint  He returns to department for hand therapy to begin gentle ROM      Quality of life: good    Pain  Current pain ratin  At worst pain ratin  Quality: discomfort  Relieving factors: ice  Aggravating factors: lifting  Progression: improved    Social Support  Lives in: multiple-level home  Lives with: spouse    Employment status: working ()  Hand dominance: left    Treatments  Current treatment: occupational therapy  Patient Goals  Patient goals for therapy: decreased pain, increased motion, return to sport/leisure activities, return to work and increased strength          Objective     Tenderness     Additional Tenderness Details  TTP over dorsum of wrist     Neurological Testing     Sensation     Wrist/Hand     Right   Intact: light touch    Active Range of Motion     Right Elbow   Normal active range of motion    Right Wrist   Wrist flexion: 45 degrees   Wrist extension: 70 degrees   Radial deviation: 10 degrees with pain  Ulnar deviation: 40 degrees with pain    Right Thumb   Opposition: Full opposition    Additional Active Range of Motion Details  Full composite fist  Mild extrinsic tightness    Strength/Myotome Testing     Left Wrist/Hand      (2nd hand position)     Trial 1: 60    Thumb Strength  Key/Lateral Pinch     Trial 1: 17    Right Wrist/Hand   Wrist extension: 3+  Wrist flexion: 3+  Radial deviation: 3+  Ulnar deviation: 3+     (2nd hand position)     Trial 1: 20    Thumb Strength   Key/Lateral Pinch     Trial 1: 13    Swelling     Left Wrist/Hand   Circumference wrist: 16 cm    Right Wrist/Hand   Circumference wrist: 16 8 cm            Precautions: Universal sx- 1/23/23, ROM only      Manuals 2/8             IE 30'            Scar mob 3m            Graston L 4m            STM extensors 4m            Neuro Re-Ed                          HEP- wrist A/SUMI, AMINATAES reviewed                                                                             Ther Ex             Wrist PROM 2m            Wrist AROM                                                                                           Ther Activity                                       Gait Training                                       Modalities             NITA R 10m

## 2023-02-15 ENCOUNTER — OFFICE VISIT (OUTPATIENT)
Dept: OCCUPATIONAL THERAPY | Facility: CLINIC | Age: 53
End: 2023-02-15

## 2023-02-15 DIAGNOSIS — Z98.890 STATUS POST DEBRIDEMENT: Primary | ICD-10-CM

## 2023-02-15 NOTE — PROGRESS NOTES
Daily Note     Today's date: 2/15/2023  Patient name: Chaitanya Mcdowell  : 1970  MRN: [id number]  Referring provider: Kole Edwards MD  Dx:   Encounter Diagnosis     ICD-10-CM    1  Status post debridement  Z98 890                      Subjective: My wrist feels stiff  Objective: See treatment diary below      Assessment: Tolerated treatment well  Patient having discomfort with end range flexion  Plan: Continue per plan of care        Precautions: Universal sx- 23, ROM only      Manuals 2/8 2/15            IE 30'            Scar mob 3m 3m           Graston L 4m 4m           STM extensors 4m 4m           Neuro Re-Ed                          HEP- wrist A/AAROM, TGES reviewed                                                                             Ther Ex             Wrist PROM 2m gentle 2m           Wrist AROM  [facility] 3x10           Wrist maze AROM  x5           Gripping-neutral  RPW 2x30                                                               Ther Activity                                       Gait Training                                       Modalities             MH R 10m 8m           CP post  6m

## 2023-02-22 ENCOUNTER — OFFICE VISIT (OUTPATIENT)
Dept: OCCUPATIONAL THERAPY | Facility: CLINIC | Age: 53
End: 2023-02-22

## 2023-02-22 DIAGNOSIS — Z98.890 STATUS POST DEBRIDEMENT: Primary | ICD-10-CM

## 2023-02-22 NOTE — PROGRESS NOTES
Daily Note     Today's date: 2023  Patient name: Paul Heredia  : 1970  MRN: [id number]  Referring provider: Fannie Adler MD  Dx:   Encounter Diagnosis     ICD-10-CM    1  Status post debridement  Z98 890                      Subjective: It still bothers me on the ulnar side of the wrist     Objective: See treatment diary below      Assessment: Tolerated treatment well  Patient remains symptomatic over the TFCC area with pain  He has discomfort with end range wrist flexion  ROM is WNLs  Plan: Continue per plan of care        Precautions: Universal sx- 23, ROM only       Manuals 2/8 2/15 2/22           IE 30'            Scar mob 3m 3m 3m          Graston L 4m 4m 4m          STM extensors 4m 4m 4m          Neuro Re-Ed                          HEP- wrist A/LAM JONAS reviewed                                                                             Ther Ex             Wrist PROM 2m gentle 2m gentle 2m           Wrist AROM  Cone 3x10 Cone 3x10          Wrist maze AROM  x5 x5          Gripping-neutral  RPW 2x30 RPW 2x30                                                              Ther Activity                                       Gait Training                                       Modalities              R 10m 8m 8m          CP post  6m 6m

## 2023-03-03 ENCOUNTER — OFFICE VISIT (OUTPATIENT)
Dept: OCCUPATIONAL THERAPY | Facility: CLINIC | Age: 53
End: 2023-03-03

## 2023-03-03 DIAGNOSIS — Z98.890 STATUS POST DEBRIDEMENT: Primary | ICD-10-CM

## 2023-03-03 NOTE — PROGRESS NOTES
Daily Note     Today's date: 3/3/2023  Patient name: Nati Mei  : 1970  MRN: [id number]  Referring provider: Wendi Mchugh MD  Dx:   Encounter Diagnosis     ICD-10-CM    1  Status post debridement  Z98 890                      Subjective: The TFCC area is where it bothers me the most     Objective: See treatment diary below      Assessment: Tolerated treatment well  Patient with updated orders to initiate strengthening  He has moderate pain over the TFCC area  Suggested wrist widget for support for pain and weightbearing activities (CPR)  Plan: Continue per plan of care  Precautions: Universal sx- 23, ROM only       Manuals 2/8 2/15 2/22 3/3          IE 30'            Scar mob 3m 3m 3m 3m         Graston L 4m 4m 4m 4m         STM extensors 4m 4m 4m 4m         Neuro Re-Ed                          HEP- wrist A/AAROM, TGES reviewed                                                                             Ther Ex             Wrist PROM 2m gentle 2m gentle 2m  2m         Wrist AROM  Cone 3x10 Cone 3x10 #1 3x10         Wrist maze AROM  x5 x5          Gripping-neutral  RPW 2x30 RPW 2x30          handhelper w   Blocks- supinate    2 red         Ball circles- stability    Y 2x10                                   Ther Activity                                       Gait Training                                       Modalities             MH R 10m 8m 8m 8m         CP post  6m 6m 6m

## 2023-03-08 ENCOUNTER — OFFICE VISIT (OUTPATIENT)
Dept: OCCUPATIONAL THERAPY | Facility: CLINIC | Age: 53
End: 2023-03-08

## 2023-03-08 DIAGNOSIS — Z98.890 STATUS POST DEBRIDEMENT: Primary | ICD-10-CM

## 2023-03-08 NOTE — PROGRESS NOTES
Daily Note     Today's date: 3/8/2023  Patient name: Dai Martinez  : 1970  MRN: [id number]  Referring provider: Ramírez Barillas MD  Dx:   Encounter Diagnosis     ICD-10-CM    1  Status post debridement  Z98 890                      Subjective: I am feeling a little better  Objective: See treatment diary below      Assessment: Tolerated treatment well  Patient Continues with ulnar sided wrist pain  He has discomfort with weightbearing activity of the wrist         Plan: Continue per plan of care  Precautions: Universal sx- 23, ROM only       Manuals 2/8 2/15 2/22 3/3 3/8         IE 30'            Scar mob 3m 3m 3m 3m 3m        Graston L 4m 4m 4m 4m 4m        STM extensors 4m 4m 4m 4m 4m        Neuro Re-Ed                          HEP- wrist A/AAROM, TGES reviewed                                                                             Ther Ex             Wrist PROM 2m gentle 2m gentle 2m  2m 2m        Wrist AROM  Cone 3x10 Cone 3x10 #1 3x10 2#  3x10        Wrist maze AROM  x5 x5  x5        Gripping-neutral  RPW 2x30 RPW 2x30          handhelper w   Blocks- supinate    2 red 1set  2red        Ball circles- stability    Y 2x10 Y 2x10        weightbearing     Blue powerweb                     Ther Activity                                       Gait Training                                       Modalities             MH R 10m 8m 8m 8m 8m        CP post  6m 6m 6m 6m

## 2023-03-13 ENCOUNTER — OFFICE VISIT (OUTPATIENT)
Dept: OCCUPATIONAL THERAPY | Facility: CLINIC | Age: 53
End: 2023-03-13

## 2023-03-13 DIAGNOSIS — Z98.890 STATUS POST DEBRIDEMENT: Primary | ICD-10-CM

## 2023-03-13 NOTE — PROGRESS NOTES
Daily Note     Today's date: 3/13/2023  Patient name: Magda Cox  : 1970  MRN: [id number]  Referring provider: Kenyatta Kruse MD  Dx:   Encounter Diagnosis     ICD-10-CM    1  Status post debridement  Z98 890                      Subjective: I am getting stronger  Objective: See treatment diary below      Assessment: Tolerated treatment well  Patient has improved function  Continues with ulnar sided wrist pain with weightbearing  Plan: Continue per plan of care  Precautions: Universal sx- 23, ROM only       Manuals 2/8 2/15 2/22 3/3 3/8 3/13        IE 30'            Scar mob 3m 3m 3m 3m 3m 3m       Graston L 4m 4m 4m 4m 4m 4m       STM extensors 4m 4m 4m 4m 4m 4m       Neuro Re-Ed                          HEP- wrist A/AAROM, TGES reviewed                                                                             Ther Ex             Wrist PROM 2m gentle 2m gentle 2m  2m 2m 2m       Wrist AROM  Cone 3x10 Cone 3x10 #1 3x10 2#  3x10 #2/3 3x10       Wrist maze AROM  x5 x5  x5        Gripping-neutral  RPW 2x30 RPW 2x30          handhelper w   Blocks- supinate    2 red 1set  2red 1 set 2 red       Ball circles- stability    Y 2x10 Y 2x10 Y 2x10       weightbearing     Blue powerweb Blue       P/S AROM      LG hammer 3x10       Ther Activity                                       Gait Training                                       Modalities             MH R 10m 8m 8m 8m 8m 8m       CP post  6m 6m 6m 6m 6m

## 2023-03-15 ENCOUNTER — OFFICE VISIT (OUTPATIENT)
Dept: OCCUPATIONAL THERAPY | Facility: CLINIC | Age: 53
End: 2023-03-15

## 2023-03-15 DIAGNOSIS — Z98.890 STATUS POST DEBRIDEMENT: Primary | ICD-10-CM

## 2023-03-15 NOTE — PROGRESS NOTES
Daily Note     Today's date: 3/15/2023  Patient name: Caleb Villafana  : 1970  MRN: [id number]  Referring provider: Aaron Ulloa MD  Dx:   Encounter Diagnosis     ICD-10-CM    1  Status post debridement  Z98 890                      Subjective: I am getting stronger  Objective: See treatment diary below      Assessment: Tolerated treatment well  Patient has improved function  Continues with ulnar sided wrist pain with weightbearing  Plan: Continue per plan of care  Precautions: Universal sx- 23,      Manuals 2/8 2/15 2/22 3/3 3/8 3/13 3/15       IE 30'            Scar mob 3m 3m 3m 3m 3m 3m 3m      Graston L 4m 4m 4m 4m 4m 4m 4m      STM extensors 4m 4m 4m 4m 4m 4m 4m      Neuro Re-Ed                          HEP- wrist A/AAROM, TGES reviewed                                                                             Ther Ex             Wrist PROM 2m gentle 2m gentle 2m  2m 2m 2m 2m      Wrist AROM  Cone 3x10 Cone 3x10 #1 3x10 2#  3x10 #2/3 3x10 #2/3 3x10      Wrist maze AROM  x5 x5  x5        Gripping-neutral  RPW 2x30 RPW 2x30    BPW 2x30      handhelper w   Blocks- supinate    2 red 1set  2red 1 set 2 red 1 set 2 red      Ball circles- stability    Y 2x10 Y 2x10 Y 2x10 Y 2x10      weightbearing     Blue powerweb Blue G/B x20      P/S AROM      LG hammer 3x10 LG 3x10      Ther Activity                                       Gait Training                                       Modalities             MH R 10m 8m 8m 8m 8m 8m 8m      CP post  6m 6m 6m 6m 6m 6m

## 2023-03-22 ENCOUNTER — OFFICE VISIT (OUTPATIENT)
Dept: OCCUPATIONAL THERAPY | Facility: CLINIC | Age: 53
End: 2023-03-22

## 2023-03-22 DIAGNOSIS — Z98.890 STATUS POST DEBRIDEMENT: Primary | ICD-10-CM

## 2023-03-22 NOTE — PROGRESS NOTES
Daily Note     Today's date: 3/22/2023  Patient name: David Rivas  : 1970  MRN: [id number]  Referring provider: Bharti Magana MD  Dx:   Encounter Diagnosis     ICD-10-CM    1  Status post debridement  Z98 890                      Subjective: stiff      Objective: See treatment diary below      Assessment: Tolerated treatment well  Patient has improved ROM   Continued c/o stiffness  Plan: Continue per plan of care  Precautions: Universal sx- 23,      Manuals 2/8 2/15 2/22 3/3 3/8 3/13 3/15 3/22      IE 30'            Scar mob 3m 3m 3m 3m 3m 3m 3m 3m     Graston L 4m 4m 4m 4m 4m 4m 4m 4m     STM extensors 4m 4m 4m 4m 4m 4m 4m 4m     Neuro Re-Ed                          HEP- wrist A/AAROM, TGES reviewed                                                                             Ther Ex             Wrist PROM 2m gentle 2m gentle 2m  2m 2m 2m 2m 2m     Wrist AROM  Cone 3x10 Cone 3x10 #1 3x10 2#  3x10 #2/3 3x10 #2/3 3x10 3#  3x10     Wrist maze AROM  x5 x5  x5        Gripping-neutral  RPW 2x30 RPW 2x30    BPW 2x30 BPW  2x30     handhelper w   Blocks- supinate    2 red 1set  2red 1 set 2 red 1 set 2 red 1 set  red     Ball circles- stability    Y 2x10 Y 2x10 Y 2x10 Y 2x10 y 2x10     weightbearing     Blue powerweb Blue G/B x20 B x 20     P/S AROM      LG hammer 3x10 LG 3x10 LG  3x10     Ther Activity                                       Gait Training                                       Modalities             MH R 10m 8m 8m 8m 8m 8m 8m 8m     CP post  6m 6m 6m 6m 6m 6m 6m

## 2023-03-24 ENCOUNTER — OFFICE VISIT (OUTPATIENT)
Dept: OCCUPATIONAL THERAPY | Facility: CLINIC | Age: 53
End: 2023-03-24

## 2023-03-24 DIAGNOSIS — Z98.890 STATUS POST DEBRIDEMENT: Primary | ICD-10-CM

## 2023-03-24 NOTE — PROGRESS NOTES
Daily Note     Today's date: 3/24/2023  Patient name: River Orozco  : 1970  MRN: [id number]  Referring provider: Katelyn Benitez MD  Dx:   Encounter Diagnosis     ICD-10-CM    1  Status post debridement  Z98 890                      Subjective: Still having the pain  Objective: See treatment diary below      Assessment: Tolerated treatment well  Patient continues to have pain with weight bearing and end range flexion ulnar side of wrist   Pt  Performing exercises in pain free range of motion  Plan: Continue per plan of care  Precautions: Universal sx- 23,      Manuals 2/8 2/15 2/22 3/3 3/8 3/13 3/15 3/22 3/24     IE 30'            Scar mob 3m 3m 3m 3m 3m 3m 3m 3m 3m    Graston L 4m 4m 4m 4m 4m 4m 4m 4m 4m    STM extensors 4m 4m 4m 4m 4m 4m 4m 4m 4m    Neuro Re-Ed                          HEP- wrist A/AAROM, TGES reviewed                                                                             Ther Ex             Wrist PROM 2m gentle 2m gentle 2m  2m 2m 2m 2m 2m 2m    Wrist AROM  Cone 3x10 Cone 3x10 #1 3x10 2#  3x10 #2/3 3x10 #2/3 3x10 3#  3x10 #3 3x10    Wrist maze AROM  x5 x5  x5        Gripping-neutral  RPW 2x30 RPW 2x30    BPW 2x30 BPW  2x30 BPW 2x30    handhelper w   Blocks- supinate    2 red 1set  2red 1 set 2 red 1 set 2 red 1 set  red 1 set red    Ball circles- stability    Y 2x10 Y 2x10 Y 2x10 Y 2x10 y 2x10 #1 2x10    weightbearing     Blue powerweb Blue G/B x20 B x 20     P/S AROM      LG hammer 3x10 LG 3x10 LG  3x10 LG 3x10    P/S therabar         R 3x10                              Gait Training                                       Modalities             MH R 10m 8m 8m 8m 8m 8m 8m 8m 8m    CP post  6m 6m 6m 6m 6m 6m 6m 6m

## 2023-03-27 ENCOUNTER — EVALUATION (OUTPATIENT)
Dept: PHYSICAL THERAPY | Facility: CLINIC | Age: 53
End: 2023-03-27

## 2023-03-27 ENCOUNTER — APPOINTMENT (OUTPATIENT)
Dept: OCCUPATIONAL THERAPY | Facility: CLINIC | Age: 53
End: 2023-03-27

## 2023-03-27 DIAGNOSIS — M76.71 PERONEAL TENDONITIS OF RIGHT LOWER LEG: ICD-10-CM

## 2023-03-27 DIAGNOSIS — M25.851 HIP IMPINGEMENT SYNDROME, RIGHT: Primary | ICD-10-CM

## 2023-03-27 NOTE — LETTER
2023    Dante Weaver, [address] [place] [address]    Patient: Angelita Rolle   YOB: 1970   Date of Visit: 3/27/2023     Encounter Diagnosis     ICD-10-CM    1  Hip impingement syndrome, right  M25 851       2  Peroneal tendonitis of right lower leg  M76 71           Dear Dr Osbaldo Coates:    Thank you for your recent referral of Angelita Rolle  Please review the attached evaluation summary from [address] [place] [address] recent visit  Please verify that you agree with the plan of care by signing the attached order  If you have any questions or concerns, please do not hesitate to call  I sincerely appreciate the opportunity to share in the care of one of your patients and hope to have another opportunity to work with you in the near future  Sincerely,    Jaswinder Ortiz, PT      Referring Provider:      I certify that I have read the below Plan of Care and certify the need for these services furnished under this plan of treatment while under my care  Dante Weaver, [address] [place] [address]  Via Fax: [phone]          PT Evaluation     Today's date: 3/27/2023  Patient name: Angelita Rolle  : 1970  MRN: [id number]  Referring provider: Rajni Massey MD  Dx:   Encounter Diagnosis     ICD-10-CM    1  Hip impingement syndrome, right  M25 851       2  Peroneal tendonitis of right lower leg  M76 71                      Assessment  Assessment details: Angelita Rolle is a 46 y o  male presenting to outpatient physical therapy at [place] [address] with complaints of R LE pain  He presents with decreased core and R foot strength, limited flexibility, decreased tolerance to activity and decreased functional mobility due to Hip impingement syndrome, right  (primary encounter diagnosis), Peroneal tendonitis of right lower leg and poor core strength    He would benefit from skilled PT services in order to address these deficits and reach maximum level of function  A running analysis will be performed when pain lessens  Thank you for the referral!  Impairments: abnormal gait, abnormal or restricted ROM, activity intolerance, impaired balance, impaired physical strength, lacks appropriate home exercise program and pain with function  Barriers to therapy: None  Understanding of Dx/Px/POC: excellent  Goals  ST  Independent with HEP in 2 weeks  2  Increase R LE flexibility to WNL in 3 weeks     LT  Achieve FOTO score of 60/100 in 8 weeks   2  Able to run x 5 miles without R LE pain in 8 weeks  3  Strength abdominals and intrinsic foot = 5/5 in 8 weeks  4  Excellent running mechanics in 8 weeks    Plan  Patient would benefit from: skilled PT and PT eval  Planned modality interventions: cryotherapy  Planned therapy interventions: abdominal trunk stabilization, ADL retraining, balance/weight bearing training, body mechanics training, flexibility, functional ROM exercises, home exercise program, joint mobilization, manual therapy, neuromuscular re-education, postural training, strengthening, stretching, therapeutic activities, therapeutic exercise and gait training  Frequency: 2x week  Duration in weeks: 8  Treatment plan discussed with: patient        Subjective Evaluation    History of Present Illness  Mechanism of injury: Pt reports having chronic anterior R hip pain for 2 years when rotating his hip and after running  Also recently having R anterior-lateral ankle pain since 2022 and R medial knee pain since 23 after performing resisted knee ext  He has not been able to run since 23 due to pain  Working as a  - light duty due to wrist surgery on 23  Has an inguinal hernia R side              Recurrent probem    Quality of life: good    Pain  Current pain ratin  At best pain ratin  At worst pain ratin  Quality: dull ache, throbbing and tight  Relieving factors: rest  Aggravating factors: running (squatting)  Progression: worsening    Social Support  Steps to enter house: yes  Stairs in house: yes   Lives in: multiple-level home  Lives with: spouse    Employment status: not working    Diagnostic Tests  No diagnostic tests performed  Treatments  No previous or current treatments  Patient Goals  Patient goals for therapy: decreased pain, increased motion, increased strength and return to sport/leisure activities          Objective     Palpation     Right   Hypertonic in the adductor longus and rectus femoris  Tenderness     Right Hip   Tenderness in the ASIS  No tenderness in the greater trochanter  Lumbar Screen  Lumbar range of motion within normal limits with the following exceptions:Abdominal strength = 4-/5  Lumbar AROM = WNL all motions  Neurological Testing     Sensation     Hip   Left Hip   Intact: light touch    Right Hip   Intact: light touch    Active Range of Motion   Left Hip   Normal active range of motion    Right Hip   Normal active range of motion    Additional Active Range of Motion Details  R hip ext limited by 10 degrees due to tight rectus femoris  Passive Range of Motion     Additional Passive Range of Motion Details  Mod tightness R soleus, peroneals  Strength/Myotome Testing     Left Hip   Normal muscle strength    Right Hip   Normal muscle strength    Additional Strength Details  Foot intrinsics R = 4-/5     Tests     Right Hip   Juan: Positive       Ambulation     Ambulation: Level Surfaces   Ambulation without assistive device: independent        POC EXPIRES On:  5/22/23  PRECAUTIONS:  None  CO-MORBIDITES:  None  PERSONAL FACTORS:  None      Manuals HEP 3/27                                                               Neuro Re-Ed     Supine PPT with marching L/R 3/27 20 ea                                                                                         Ther Ex    Dynamic leg swings flex/ext prior to running 3/27 15 ea           Kneeling "ant tib stretch R 3/27 20\" 3           Standing R hip adductor stretch 3/27 20\" 3           Standing soleus stretch R 3/27 20\" 3                                                               Ther Activity                              Gait Training                              Modalities                                                       "

## 2023-03-27 NOTE — PROGRESS NOTES
PT Evaluation     Today's date: 3/27/2023  Patient name: Pooja Guthrie  : 1970  MRN: [id number]  Referring provider: Radha Heath MD  Dx:   Encounter Diagnosis     ICD-10-CM    1  Hip impingement syndrome, right  M25 851       2  Peroneal tendonitis of right lower leg  M76 71                      Assessment  Assessment details: Pooja Guthrie is a 46 y o  male presenting to outpatient physical therapy at [facility] with complaints of R LE pain  He presents with decreased core and R foot strength, limited flexibility, decreased tolerance to activity and decreased functional mobility due to Hip impingement syndrome, right  (primary encounter diagnosis), Peroneal tendonitis of right lower leg and poor core strength  He would benefit from skilled PT services in order to address these deficits and reach maximum level of function  A running analysis will be performed when pain lessens  Thank you for the referral!  Impairments: abnormal gait, abnormal or restricted ROM, activity intolerance, impaired balance, impaired physical strength, lacks appropriate home exercise program and pain with function  Barriers to therapy: None  Understanding of Dx/Px/POC: excellent  Goals  ST  Independent with HEP in 2 weeks  2  Increase R LE flexibility to WNL in 3 weeks     LT  Achieve FOTO score of 60/100 in 8 weeks   2  Able to run x 5 miles without R LE pain in 8 weeks  3  Strength abdominals and intrinsic foot = 5/5 in 8 weeks  4    Excellent running mechanics in 8 weeks    Plan  Patient would benefit from: skilled PT and PT eval  Planned modality interventions: cryotherapy  Planned therapy interventions: abdominal trunk stabilization, ADL retraining, balance/weight bearing training, body mechanics training, flexibility, functional ROM exercises, home exercise program, joint mobilization, manual therapy, neuromuscular re-education, postural training, strengthening, stretching, therapeutic activities, therapeutic exercise and gait training  Frequency: 2x week  Duration in weeks: 8  Treatment plan discussed with: patient        Subjective Evaluation    History of Present Illness  Mechanism of injury: Pt reports having chronic anterior R hip pain for 2 years when rotating his hip and after running  Also recently having R anterior-lateral ankle pain since 2022 and R medial knee pain since 23 after performing resisted knee ext  He has not been able to run since 23 due to pain  Working as a  - light duty due to wrist surgery on 23  Has an inguinal hernia R side  Recurrent probem    Quality of life: good    Pain  Current pain ratin  At best pain ratin  At worst pain ratin  Quality: dull ache, throbbing and tight  Relieving factors: rest  Aggravating factors: running (squatting)  Progression: worsening    Social Support  Steps to enter house: yes  Stairs in house: yes   Lives in: multiple-level home  Lives with: spouse    Employment status: not working    Diagnostic Tests  No diagnostic tests performed  Treatments  No previous or current treatments  Patient Goals  Patient goals for therapy: decreased pain, increased motion, increased strength and return to sport/leisure activities          Objective     Palpation     Right   Hypertonic in the adductor longus and rectus femoris  Tenderness     Right Hip   Tenderness in the ASIS  No tenderness in the greater trochanter  Lumbar Screen  Lumbar range of motion within normal limits with the following exceptions:Abdominal strength = 4-/5  Lumbar AROM = WNL all motions  Neurological Testing     Sensation     Hip   Left Hip   Intact: light touch    Right Hip   Intact: light touch    Active Range of Motion   Left Hip   Normal active range of motion    Right Hip   Normal active range of motion    Additional Active Range of Motion Details  R hip ext limited by 10 degrees due to tight rectus femoris       Passive Range "of Motion     Additional Passive Range of Motion Details  Mod tightness R soleus, peroneals  Strength/Myotome Testing     Left Hip   Normal muscle strength    Right Hip   Normal muscle strength    Additional Strength Details  Foot intrinsics R = 4-/5     Tests     Right Hip   Juan: Positive       Ambulation     Ambulation: Level Surfaces   Ambulation without assistive device: independent        POC EXPIRES On:  5/22/23  PRECAUTIONS:  None  CO-MORBIDITES:  None  PERSONAL FACTORS:  None      Manuals HEP 3/27                                                               Neuro Re-Ed     Supine PPT with marching L/R 3/27 20 ea                                                                                         Ther Ex    Dynamic leg swings flex/ext prior to running 3/27 15 ea           Kneeling ant tib stretch R 3/27 20\" 3           Standing R hip adductor stretch 3/27 20\" 3           Standing soleus stretch R 3/27 20\" 3                                                               Ther Activity                              Gait Training                              Modalities                                        "

## 2023-03-29 ENCOUNTER — OFFICE VISIT (OUTPATIENT)
Dept: OCCUPATIONAL THERAPY | Facility: CLINIC | Age: 53
End: 2023-03-29

## 2023-03-29 DIAGNOSIS — Z98.890 STATUS POST DEBRIDEMENT: Primary | ICD-10-CM

## 2023-03-29 NOTE — PROGRESS NOTES
Daily Note     Today's date: 3/29/2023  Patient name: Aleksandra Shepard  : 1970  MRN: [id number]  Referring provider: Baldo Fischer MD  Dx:   Encounter Diagnosis     ICD-10-CM    1  Status post debridement  Z98 890                      Subjective: Still having the pain  Objective: See treatment diary below      Assessment: Tolerated treatment well  Patient continues to have pain with weight bearing and end range flexion ulnar side of wrist   Pt  Performing exercises in pain free range of motion  Plan: Continue per plan of care  Precautions: Universal sx- 23,      Manuals 2/8 2/15 2/22 3/3 3/8 3/13 3/15 3/22 3/24 3/29    IE 30'            Scar mob 3m 3m 3m 3m 3m 3m 3m 3m 3m 3m   Graston L 4m 4m 4m 4m 4m 4m 4m 4m 4m 4m   STM extensors 4m 4m 4m 4m 4m 4m 4m 4m 4m 4m   Neuro Re-Ed                          HEP- wrist A/AAROM, TGES reviewed                                                                             Ther Ex             Wrist PROM 2m gentle 2m gentle 2m  2m 2m 2m 2m 2m 2m 2m   Wrist AROM  Cone 3x10 Cone 3x10 #1 3x10 2#  3x10 #2/3 3x10 #2/3 3x10 3#  3x10 #3 3x10 #3 3x10   Wrist maze AROM  x5 x5  x5        Gripping-neutral  RPW 2x30 RPW 2x30    BPW 2x30 BPW  2x30 BPW 2x30 BPW 2x30   handhelper w   Blocks- supinate    2 red 1set  2red 1 set 2 red 1 set 2 red 1 set  red 1 set red 1 set red   Ball circles- stability    Y 2x10 Y 2x10 Y 2x10 Y 2x10 y 2x10 #1 2x10 #1 3x10   weightbearing     Blue powerweb Blue G/B x20 B x 20     P/S AROM      LG hammer 3x10 LG 3x10 LG  3x10 LG 3x10 LG 3x10   P/S therabar         R 3x10 R 3x10                             Gait Training                                       Modalities             MH R 10m 8m 8m 8m 8m 8m 8m 8m 8m 8m   CP post  6m 6m 6m 6m 6m 6m 6m 6m 6m

## 2023-03-31 ENCOUNTER — OFFICE VISIT (OUTPATIENT)
Dept: SURGERY | Facility: CLINIC | Age: 53
End: 2023-03-31

## 2023-03-31 ENCOUNTER — OFFICE VISIT (OUTPATIENT)
Dept: OCCUPATIONAL THERAPY | Facility: CLINIC | Age: 53
End: 2023-03-31

## 2023-03-31 VITALS
WEIGHT: 140 LBS | BODY MASS INDEX: 20.73 KG/M2 | HEIGHT: 69 IN | SYSTOLIC BLOOD PRESSURE: 118 MMHG | HEART RATE: 74 BPM | DIASTOLIC BLOOD PRESSURE: 81 MMHG

## 2023-03-31 DIAGNOSIS — Z98.890 STATUS POST DEBRIDEMENT: Primary | ICD-10-CM

## 2023-03-31 DIAGNOSIS — K40.90 RIGHT INGUINAL HERNIA: Primary | ICD-10-CM

## 2023-03-31 DIAGNOSIS — R11.0 CHRONIC NAUSEA: ICD-10-CM

## 2023-03-31 RX ORDER — LORATADINE 10 MG/1
CAPSULE, LIQUID FILLED ORAL
COMMUNITY

## 2023-03-31 RX ORDER — AMOXICILLIN 500 MG
2 CAPSULE ORAL EVERY 24 HOURS
COMMUNITY

## 2023-03-31 NOTE — PROGRESS NOTES
Assessment/Plan:    Right inguinal hernia  55-year-old male who presents with a 6-month history of a new right groin bulge consistent with a right inguinal hernia  Plan:  He is relatively asymptomatic from his right inguinal hernia and we discussed both the pathophysiology of hernias, as well as appropriate treatment strategies  Given his lack of symptoms he would like to pursue a watchful waiting strategy at this time  He is likely interested in elective hernia repair, possibly in early 2024  We did discuss the warning signs of an incarcerated and strangulated hernia, and he acknowledged that he would call with any of these concerning symptoms  He does not have any activity limitations at this time, but should he develop worsening symptoms of his hernia, he will return to clinic sooner  Additionally he has a tiny subcentimeter umbilical hernia, that we would theoretically fix at the time of his right inguinal hernia repair  Chronic nausea  Has had several months of some longer lasting nausea symptoms after meals, last worked up in 2018 by Dr Valentin Montes De Oca at [facility] in [place]  Like to obtain a gastric emptying study to evaluate for any gastroparesis  I told the patient I would call him if this is grossly abnormal to discuss the results  I have also put in a referral for him to see [address] [place] [address] gastroenterology so he can establish care with a gastroenterologist within our network  Diagnoses and all orders for this visit:    Right inguinal hernia    Chronic nausea  -     NM gastric emptying; Future  -     Ambulatory Referral to Gastroenterology;  Future    Other orders  -     Multiple Vitamins-Minerals (EYE VITAMINS & MINERALS PO); as directed Orally  -     Multiple Vitamins-Minerals (MENS ONE DAILY PO); every 24 hours  -     Loratadine 10 MG CAPS; 1 tablet Orally Once a day prn  -     Omega-3 Fatty Acids (Fish Oil) 1200 MG CAPS; 2 capsules every 24 hours          Subjective:      Patient ID: Florencio Rodríguez is a 46 y o  male  66-year-old male presents with a 6-month history of a small right groin bulge  Patient states the he incidentally noticed a small bulge and was worked up with a right groin ultrasound consistent with a right inguinal hernia  He denies any significant pain in the area, however he is having some hip flexor issues on that side and is seeing physical therapy  It is hard for him to differentiate the hip flexor pain versus groin bulge pain  He denies any changes to bowel or bladder habits, and is otherwise asymptomatic  He denies any fevers, chills, chest pain, or shortness of breath  He does have some moderate nausea, especially after larger meals that has been lasting for several days at a time  He has had no vomiting  The following portions of the patient's history were reviewed and updated as appropriate:   He  has a past medical history of Macular degeneration  He   Patient Active Problem List    Diagnosis Date Noted   • Right inguinal hernia 03/31/2023   • Chronic nausea 03/31/2023     He  has a past surgical history that includes Hand surgery (Left, 03/12/2019); Knee surgery; and EGD (05/10/2017)  His family history includes Colon cancer in his other; Heart disease in his father; No Known Problems in his mother  He  reports that he has never smoked  He does not have any smokeless tobacco history on file  He reports that he does not currently use alcohol  He reports that he does not use drugs    Current Outpatient Medications   Medication Sig Dispense Refill   • Loratadine 10 MG CAPS 1 tablet Orally Once a day prn     • Multiple Vitamins-Minerals (EYE VITAMINS & MINERALS PO) as directed Orally     • Multiple Vitamins-Minerals (MENS ONE DAILY PO) every 24 hours     • Na Sulfate-K Sulfate-Mg Sulf (SUPREP BOWEL PREP KIT) 17 5-3 13-1 6 GM/177ML SOLN Use as directed 2 Bottle 0   • Omega-3 Fatty Acids (Fish Oil) 1200 MG CAPS 2 capsules every 24 hours       No current "facility-administered medications for this visit  Current Outpatient Medications on File Prior to Visit   Medication Sig   • Loratadine 10 MG CAPS 1 tablet Orally Once a day prn   • Multiple Vitamins-Minerals (EYE VITAMINS & MINERALS PO) as directed Orally   • Multiple Vitamins-Minerals (MENS ONE DAILY PO) every 24 hours   • Na Sulfate-K Sulfate-Mg Sulf (SUPREP BOWEL PREP KIT) 17 5-3 13-1 6 GM/177ML SOLN Use as directed   • Omega-3 Fatty Acids (Fish Oil) 1200 MG CAPS 2 capsules every 24 hours     No current facility-administered medications on file prior to visit  He has No Known Allergies       Review of Systems   Constitutional: Negative for appetite change, chills, diaphoresis and fever  HENT: Negative for nosebleeds and trouble swallowing  Eyes: Negative  Respiratory: Negative for cough, shortness of breath and wheezing  Cardiovascular: Negative for chest pain, palpitations and leg swelling  Gastrointestinal: Positive for nausea  Negative for abdominal distention, abdominal pain and vomiting  Genitourinary: Negative for difficulty urinating, flank pain and frequency  Musculoskeletal: Negative for arthralgias, joint swelling and myalgias  Skin: Negative for pallor and rash  Neurological: Negative for dizziness, facial asymmetry and speech difficulty  Hematological: Does not bruise/bleed easily  Psychiatric/Behavioral: Negative for agitation and confusion  All other systems reviewed and are negative  Objective:      /81   Pulse 74   Ht 5' 9\" (1 753 m)   Wt 63 5 kg (140 lb)   BMI 20 67 kg/m²          Physical Exam  Vitals and nursing note reviewed  Constitutional:       General: He is not in acute distress  Appearance: Normal appearance  He is not toxic-appearing  HENT:      Head: Normocephalic and atraumatic  Mouth/Throat:      Mouth: Mucous membranes are moist    Eyes:      Extraocular Movements: Extraocular movements intact        Pupils: Pupils are " equal, round, and reactive to light  Cardiovascular:      Rate and Rhythm: Normal rate and regular rhythm  Pulses: Normal pulses  Pulmonary:      Effort: Pulmonary effort is normal  No respiratory distress  Breath sounds: Normal breath sounds  No wheezing  Abdominal:      General: There is no distension  Palpations: Abdomen is soft  There is no mass  Tenderness: There is no abdominal tenderness  There is no guarding or rebound  Hernia: A hernia is present  Comments: Small right inguinal hernia, easily reducible  Small subcentimeter umbilical hernia reducible  No left inguinal hernia  Musculoskeletal:         General: No swelling or deformity  Normal range of motion  Cervical back: Normal range of motion and neck supple  Right lower leg: No edema  Left lower leg: No edema  Skin:     General: Skin is warm and dry  Coloration: Skin is not jaundiced  Neurological:      General: No focal deficit present  Mental Status: He is alert and oriented to person, place, and time     Psychiatric:         Mood and Affect: Mood normal          Behavior: Behavior normal

## 2023-03-31 NOTE — PROGRESS NOTES
Daily Note     Today's date: 3/31/2023  Patient name: Tramaine Loja  : 1970  MRN: [id number]  Referring provider: Ricardo Pickens MD  Dx:   Encounter Diagnosis     ICD-10-CM    1  Status post debridement  Z98 890                      Subjective: I am really sore today  Objective: See treatment diary below      Assessment: Tolerated treatment well  Patient has complaint of increased soreness today, TherEx down graded  Pt  Performing exercises in pain free range of motion  Trial of KT tape for TFCC support  Plan: Continue per plan of care  Precautions: Universal sx- 23,    Manuals 3/31                     Scar mob           Graston L           STM extensors           Neuro Re-Ed                      HEP- wrist A/AAROM, TGES                                                                  Ther Ex           Wrist PROM           Wrist AROM Foam roll stretch          Wrist maze AROM           Gripping-neutral BPW 2x30          handhelper w  Blocks- supinate           Ball circles- stability           weightbearing           P/S AROM           P/S therabar R 3x10                                Gait Training                                 Modalities U/S  8 8m          MH R           CP post 6m                Manuals 2/8 2/15 2/22 3/3 3/8 3/13 3/15 3/22 3/24 3/29    IE 30'            Scar mob 3m 3m 3m 3m 3m 3m 3m 3m 3m 3m   Graston L 4m 4m 4m 4m 4m 4m 4m 4m 4m 4m   STM extensors 4m 4m 4m 4m 4m 4m 4m 4m 4m 4m   Neuro Re-Ed                          HEP- wrist A/AAROM, TGES reviewed                                                                             Ther Ex             Wrist PROM 2m gentle 2m gentle 2m  2m 2m 2m 2m 2m 2m 2m   Wrist AROM  Cone 3x10 Cone 3x10 #1 3x10 2#  3x10 #2/3 3x10 #2/3 3x10 3#  3x10 #3 3x10 #3 3x10   Wrist maze AROM  x5 x5  x5        Gripping-neutral  RPW 2x30 RPW 2x30    BPW 2x30 BPW  2x30 BPW 2x30 BPW 2x30   handhelper w   Blocks- supinate    2 red 1set  2red 1 set 2 red 1 set 2 red 1 set  red 1 set red 1 set red   Ball circles- stability    Y 2x10 Y 2x10 Y 2x10 Y 2x10 y 2x10 #1 2x10 #1 3x10   weightbearing     Blue powerweb Blue G/B x20 B x 20     P/S AROM      LG hammer 3x10 LG 3x10 LG  3x10 LG 3x10 LG 3x10   P/S therabar         R 3x10 R 3x10                             Gait Training                                       Modalities             MH R 10m 8m 8m 8m 8m 8m 8m 8m 8m 8m   CP post  6m 6m 6m 6m 6m 6m 6m 6m 6m

## 2023-03-31 NOTE — ASSESSMENT & PLAN NOTE
Has had several months of some longer lasting nausea symptoms after meals, last worked up in 2018 by Dr Reji Montoya at [facility] in [place]  Like to obtain a gastric emptying study to evaluate for any gastroparesis  I told the patient I would call him if this is grossly abnormal to discuss the results  I have also put in a referral for him to see [address] gastroenterology so he can establish care with a gastroenterologist within our network

## 2023-03-31 NOTE — ASSESSMENT & PLAN NOTE
59-year-old male who presents with a 6-month history of a new right groin bulge consistent with a right inguinal hernia  Plan:  He is relatively asymptomatic from his right inguinal hernia and we discussed both the pathophysiology of hernias, as well as appropriate treatment strategies  Given his lack of symptoms he would like to pursue a watchful waiting strategy at this time  He is likely interested in elective hernia repair, possibly in early 2024  We did discuss the warning signs of an incarcerated and strangulated hernia, and he acknowledged that he would call with any of these concerning symptoms  He does not have any activity limitations at this time, but should he develop worsening symptoms of his hernia, he will return to clinic sooner  Additionally he has a tiny subcentimeter umbilical hernia, that we would theoretically fix at the time of his right inguinal hernia repair

## 2023-04-03 ENCOUNTER — OFFICE VISIT (OUTPATIENT)
Dept: PHYSICAL THERAPY | Facility: CLINIC | Age: 53
End: 2023-04-03

## 2023-04-03 ENCOUNTER — OFFICE VISIT (OUTPATIENT)
Dept: OCCUPATIONAL THERAPY | Facility: CLINIC | Age: 53
End: 2023-04-03

## 2023-04-03 DIAGNOSIS — M25.851 HIP IMPINGEMENT SYNDROME, RIGHT: Primary | ICD-10-CM

## 2023-04-03 DIAGNOSIS — M76.71 PERONEAL TENDONITIS OF RIGHT LOWER LEG: ICD-10-CM

## 2023-04-03 DIAGNOSIS — Z98.890 STATUS POST DEBRIDEMENT: Primary | ICD-10-CM

## 2023-04-03 NOTE — PROGRESS NOTES
Daily Note     Today's date: 4/3/2023  Patient name: Shobha Moreland  : 1970  MRN: [id number]  Referring provider: Dennise Garnica MD  Dx:   Encounter Diagnosis     ICD-10-CM    1  Status post debridement  Z98 890                      Subjective: The tape really helped  Objective: See treatment diary below      Assessment: Tolerated treatment well  Patient has improved symptoms  He has relief with KT tape for TFCC support  Continue to upgrade POC as symptoms improve  Plan: Continue per plan of care  Precautions: Universal sx- 23,    Manuals 3/31 4/3                    Scar mob  2m         Graston L  4m         STM extensors  4m         Neuro Re-Ed                      HEP- wrist A/AAROM, TGES                                                                  Ther Ex           Wrist PROM           Wrist AROM Foam roll stretch x10         Wrist maze AROM           Gripping-neutral BPW 2x30 BPW 2x30         handhelper w  Blocks- supinate           Ball circles- stability           weightbearing           P/S AROM  SM 3x10         P/S therabar R 3x10 R 3x10                               Gait Training                                 Modalities U/S  8 8m          MH R  10m         CP post 6m 6m               Manuals 2/8 2/15 2/22 3/3 3/8 3/13 3/15 3/22 3/24 3/29    IE 30'            Scar mob 3m 3m 3m 3m 3m 3m 3m 3m 3m 3m   Graston L 4m 4m 4m 4m 4m 4m 4m 4m 4m 4m   STM extensors 4m 4m 4m 4m 4m 4m 4m 4m 4m 4m   Neuro Re-Ed                          HEP- wrist A/AAROM, TGES reviewed                                                                             Ther Ex             Wrist PROM 2m gentle 2m gentle 2m  2m 2m 2m 2m 2m 2m 2m   Wrist AROM  Cone 3x10 Cone 3x10 #1 3x10 2#  3x10 #2/3 3x10 #2/3 3x10 3#  3x10 #3 3x10 #3 3x10   Wrist maze AROM  x5 x5  x5        Gripping-neutral  RPW 2x30 RPW 2x30    BPW 2x30 BPW  2x30 BPW 2x30 BPW 2x30   handhelper w   Blocks- supinate    2 red 1set  2red 1 set 2 red 1 set 2 red 1 set  red 1 set red 1 set red   Ball circles- stability    Y 2x10 Y 2x10 Y 2x10 Y 2x10 y 2x10 #1 2x10 #1 3x10   weightbearing     Blue powerweb Blue G/B x20 B x 20     P/S AROM      LG hammer 3x10 LG 3x10 LG  3x10 LG 3x10 LG 3x10   P/S therabar         R 3x10 R 3x10                             Gait Training                                       Modalities             MH R 10m 8m 8m 8m 8m 8m 8m 8m 8m 8m   CP post  6m 6m 6m 6m 6m 6m 6m 6m 6m

## 2023-04-03 NOTE — PROGRESS NOTES
"Daily Note     Today's date: 4/3/2023  Patient name: Fermin Foy  : 1970  MRN: [id number]  Referring provider: Marcela Ellington MD  Dx:   Encounter Diagnosis     ICD-10-CM    1  Hip impingement syndrome, right  M25 851       2  Peroneal tendonitis of right lower leg  M76 71                      Subjective:  Pt reports being able to run on 3/31/23  Objective: See treatment diary below      Assessment:  Pt presented to outpatient physical therapy at [place] [address] with complaints of R LE pain  He presented with decreased core and R foot strength, limited flexibility, decreased tolerance to activity and decreased functional mobility due to Hip impingement syndrome, right  (primary encounter diagnosis), Peroneal tendonitis of right lower leg and poor core strength  He will continue to benefit from skilled PT services in order to address these deficits and reach maximum level of function  A running analysis will be performed when pain lessens  Pt is now able to run 2 miles without pain with a jog-walk strategy  Plan: Add more core strengthening and running analysis as pain dissipates           POC EXPIRES On:  23  PRECAUTIONS:  None  CO-MORBIDITES:  None  PERSONAL FACTORS:  None      Manuals HEP 3/27 4/3          PROM R hip adductors   3'          Juan test stretch R hip   3'                                    Neuro Re-Ed     Supine OH fit [place] with 90/90 LE's L/R 4/3  20 ea          Supine PPT with marching L/R 3/27 20 ea 20 ea          Physioball bridges L/R back on ball   Green 15 ea                                                                           Ther Ex    Dynamic leg swings flex/ext prior to running 3/27 15 ea 15 ea          Kneeling ant tib stretch R 3/27 20\" 3 standing 20\" 3          Standing R hip adductor stretch 3/27 20\" 3 20\" 3          Standing soleus stretch R 3/27 20\" 3 20\" 3          Bike   L1 5'                                                 Ther Activity           " Gait Training                              Modalities

## 2023-04-05 ENCOUNTER — APPOINTMENT (OUTPATIENT)
Dept: PHYSICAL THERAPY | Facility: CLINIC | Age: 53
End: 2023-04-05

## 2023-04-06 ENCOUNTER — OFFICE VISIT (OUTPATIENT)
Dept: OCCUPATIONAL THERAPY | Facility: CLINIC | Age: 53
End: 2023-04-06

## 2023-04-06 DIAGNOSIS — Z98.890 STATUS POST DEBRIDEMENT: Primary | ICD-10-CM

## 2023-04-06 NOTE — PROGRESS NOTES
Daily Note     Today's date: 2023  Patient name: Katie Nguyen  : 1970  MRN: [id number]  Referring provider: Edgar Mathis MD  Dx:   Encounter Diagnosis     ICD-10-CM    1  Status post debridement  Z98 890                      Subjective: I am sore ulnar wrist chiara with typing       Objective: See treatment diary below      Assessment: Tolerated treatment well  Patient has discomfort in the ulnar wrist        Plan: Continue per plan of care  Precautions: Universal sx- 23,    Manuals 3/31 4/3 46        MWM wrist    2m        Scar mob  2m 2m        Graston L  4m 4m        STM extensors  4m 4m        Neuro Re-Ed           Tape midcarpal    apply        HEP- wrist A/AAROM, TGES                                                                  Ther Ex           Wrist PROM           Wrist AROM Foam roll stretch x10 x10        Wrist maze AROM           Gripping-neutral BPW 2x30 BPW 2x30 BPW  2x10        handhelper w  Blocks- supinate           Ball circles- stability           weightbearing           P/S AROM  SM 3x10 Small  3x10        P/S therabar R 3x10 R 3x10 R 3x10                              Gait Training                                 Modalities U/S  8 8m          MH R  10m 10m        CP post 6m 6m 6m              Manuals 2/8 2/15 2/22 3/3 3/8 3/13 3/15 3/22 3/24 3/29    IE 30'            Scar mob 3m 3m 3m 3m 3m 3m 3m 3m 3m 3m   Graston L 4m 4m 4m 4m 4m 4m 4m 4m 4m 4m   STM extensors 4m 4m 4m 4m 4m 4m 4m 4m 4m 4m   Neuro Re-Ed                          HEP- wrist A/AAROM, TGES reviewed                                                                             Ther Ex             Wrist PROM 2m gentle 2m gentle 2m  2m 2m 2m 2m 2m 2m 2m   Wrist AROM  Cone 3x10 Cone 3x10 #1 3x10 2#  3x10 #2/3 3x10 #2/3 3x10 3#  3x10 #3 3x10 #3 3x10   Wrist maze AROM  x5 x5  x5        Gripping-neutral  RPW 2x30 RPW 2x30    BPW 2x30 BPW  2x30 BPW 2x30 BPW 2x30   handhelper w   Blocks- supinate    2 red 1set  2red 1 set 2 red 1 set 2 red 1 set  red 1 set red 1 set red   Ball circles- stability    Y 2x10 Y 2x10 Y 2x10 Y 2x10 y 2x10 #1 2x10 #1 3x10   weightbearing     Blue powerweb Blue G/B x20 B x 20     P/S AROM      LG hammer 3x10 LG 3x10 LG  3x10 LG 3x10 LG 3x10   P/S therabar         R 3x10 R 3x10                             Gait Training                                       Modalities             MH R 10m 8m 8m 8m 8m 8m 8m 8m 8m 8m   CP post  6m 6m 6m 6m 6m 6m 6m 6m 6m

## 2023-04-10 ENCOUNTER — APPOINTMENT (OUTPATIENT)
Dept: PHYSICAL THERAPY | Facility: CLINIC | Age: 53
End: 2023-04-10

## 2023-04-10 DIAGNOSIS — M76.71 PERONEAL TENDONITIS OF RIGHT LOWER LEG: ICD-10-CM

## 2023-04-10 DIAGNOSIS — M25.851 HIP IMPINGEMENT SYNDROME, RIGHT: Primary | ICD-10-CM

## 2023-04-10 NOTE — PROGRESS NOTES
"Daily Note     Today's date: 4/10/2023  Patient name: Genevieve Ledezma  : 1970  MRN: [id number]  Referring provider: Zada Paget, MD  Dx:   Encounter Diagnosis     ICD-10-CM    1  Hip impingement syndrome, right  M25 851       2  Peroneal tendonitis of right lower leg  M76 71                      Subjective:  Pt reports being able to run on 3/31/23  Objective: See treatment diary below      Assessment:  Pt presented to outpatient physical therapy at [place] [address] with complaints of R LE pain  He presented with decreased core and R foot strength, limited flexibility, decreased tolerance to activity and decreased functional mobility due to Hip impingement syndrome, right  (primary encounter diagnosis), Peroneal tendonitis of right lower leg and poor core strength  He will continue to benefit from skilled PT services in order to address these deficits and reach maximum level of function  A running analysis will be performed when pain lessens  Pt is now able to run 2 miles without pain with a jog-walk strategy  Plan: Add more core strengthening and running analysis as pain dissipates           POC EXPIRES On:  23  PRECAUTIONS:  None  CO-MORBIDITES:  None  PERSONAL FACTORS:  None      Manuals HEP 3/27 4/3          PROM R hip adductors   3'          Juan test stretch R hip   3'                                    Neuro Re-Ed     Supine OH fit [place] with 90/90 LE's L/R 4/3  20 ea          Supine PPT with marching L/R 3/27 20 ea 20 ea          Physioball bridges L/R back on ball   Green 15 ea                                                                           Ther Ex    Dynamic leg swings flex/ext prior to running 3/27 15 ea 15 ea          Kneeling ant tib stretch R 3/27 20\" 3 standing 20\" 3          Standing R hip adductor stretch 3/27 20\" 3 20\" 3          Standing soleus stretch R 3/27 20\" 3 20\" 3          Bike   L1 5'                                                 Ther Activity          " Gait Training                              Modalities

## 2023-04-19 ENCOUNTER — APPOINTMENT (OUTPATIENT)
Dept: OCCUPATIONAL THERAPY | Facility: CLINIC | Age: 53
End: 2023-04-19

## 2023-04-24 ENCOUNTER — OFFICE VISIT (OUTPATIENT)
Dept: PHYSICAL THERAPY | Facility: CLINIC | Age: 53
End: 2023-04-24

## 2023-04-24 DIAGNOSIS — M25.851 HIP IMPINGEMENT SYNDROME, RIGHT: Primary | ICD-10-CM

## 2023-04-24 DIAGNOSIS — M76.71 PERONEAL TENDONITIS OF RIGHT LOWER LEG: ICD-10-CM

## 2023-04-24 NOTE — PROGRESS NOTES
Daily Note     Today's date: 2023  Patient name: Brendon France  : 1970  MRN: [id number]  Referring provider: Stephanie Resendiz MD  Dx:   Encounter Diagnosis     ICD-10-CM    1  Hip impingement syndrome, right  M25 851       2  Peroneal tendonitis of right lower leg  M76 71                      Subjective:  Pt reports being able to race a 5k yesterday  A little slower than he wanted, but happy he could run  Objective:  See treatment diary below      Assessment:  Pt presented to outpatient physical therapy at [place] [address] with complaints of R LE pain  He presented with decreased core and R foot strength, limited flexibility, decreased tolerance to activity and decreased functional mobility due to Hip impingement syndrome, right  (primary encounter diagnosis), Peroneal tendonitis of right lower leg and poor core strength  He will continue to benefit from skilled PT services in order to address these deficits and reach maximum level of function but is very close to D/C status now  A running analysis will be performed when pain lessens  He is now able to run 5k total without pain continuously  He is being smart about his gradual progression back to running  Plan: Add more core strengthening and running analysis as pain dissipates  Last scheduled visit on 23  Likely D/C then  Add core strength for HEP on 23         POC EXPIRES On:  23  PRECAUTIONS:  None  CO-MORBIDITES:  None  PERSONAL FACTORS:  None      Manuals HEP 3/27 4/3 4/12 4/17 4/19 4/24      PROM R hip adductors   3' 4 4' 4' 3'      Juan test stretch R hip   3' 4 4' 4' 3'                                Neuro Re-Ed     Supine OH fit [place] with 90/90 LE's L/R 4/3  20 ea 20 ea Black 20 ea Black 20 ea Black 20 ea      Supine PPT with marching L/R 3/27 20 ea 20 ea 20 ea 20 ea 20 ea 20 ea      Physioball bridges L/R back on ball   Green 15 ea Green 15 ea Green 30 ea Green 30 ea Green 30 ea      Disc slides "fwd/side/bck L/R    10 ea 2x10 ea 2x10 ea 2x10 ea      TB kicks L/R for balance hip flex, abd, ext      Blue 15 ea Blue 15 ea      Bosu mini squats dome down      20 20                                Ther Ex    Bike   L1 5' L1 5' L3 5' L3 5' L3 5'      Dynamic leg swings flex/ext prior to running 3/27 15 ea 15 ea 15 ea HEP        Kneeling ant tib stretch R 3/27 20\" 3 standing 20\" 3 standing 20\" 3 standing 20\" 3 stanidng 20\" 3 Staniding 20\" 3      Standing R hip adductor stretch 3/27 20\" 3 20\" 3 20\" 3 20\" 3  20\" 3 20\" 3      Standing soleus stretch R 3/27 20\" 3 20\" 3 20\" 3 20\" 3 20\" 3 20\" 3                                                          Ther Activity                              Gait Training                              Modalities                                        "

## 2023-04-26 ENCOUNTER — OFFICE VISIT (OUTPATIENT)
Dept: OCCUPATIONAL THERAPY | Facility: CLINIC | Age: 53
End: 2023-04-26

## 2023-04-26 ENCOUNTER — EVALUATION (OUTPATIENT)
Dept: PHYSICAL THERAPY | Facility: CLINIC | Age: 53
End: 2023-04-26

## 2023-04-26 DIAGNOSIS — M25.851 HIP IMPINGEMENT SYNDROME, RIGHT: Primary | ICD-10-CM

## 2023-04-26 DIAGNOSIS — Z98.890 STATUS POST DEBRIDEMENT: Primary | ICD-10-CM

## 2023-04-26 DIAGNOSIS — M76.71 PERONEAL TENDONITIS OF RIGHT LOWER LEG: ICD-10-CM

## 2023-04-26 NOTE — PROGRESS NOTES
Daily Note + D/C    Today's date: 2023  Patient name: Newton Schmidt  : 1970  MRN: [id number]  Referring provider: Crissy Shay MD  Dx:   Encounter Diagnosis     ICD-10-CM    1  Hip impingement syndrome, right  M25 851       2  Peroneal tendonitis of right lower leg  M76 71                      Subjective:  Pt reports feeling good  Objective:  See treatment diary below      Assessment:  Pt presented to outpatient physical therapy at [place] [address] with complaints of R LE pain  He presented with decreased core and R foot strength, limited flexibility, decreased tolerance to activity and decreased functional mobility due to Hip impingement syndrome, right  (primary encounter diagnosis), Peroneal tendonitis of right lower leg and poor core strength  He has now met all of his goals including returning to running without pain  His running analysis today demonstrated good rubina with slight cross body arm swing, hip adduction R>L and good IC B feet  He is now ready for D/C to HEP with a full independent program and will continue to work on modifications for his running based on deficits found today including core and hip abduction strengthening               Plan:  D/C PT        POC EXPIRES On:  23  PRECAUTIONS:  None  CO-MORBIDITES:  None  PERSONAL FACTORS:  None      Manuals HEP 3/27 4/3 4/12 4/17 4/19 4/24 4/26     PROM R hip adductors   3' 4 4' 4' 3'      Juan test stretch R hip   3' 4 4' 4' 3'                                Neuro Re-Ed     Supine OH fit [place] with 90/90 LE's L/R 4/3  20 ea 20 ea Black 20 ea Black 20 ea Black 20 ea      Supine PPT with marching L/R 3/27 20 ea 20 ea 20 ea 20 ea 20 ea 20 ea      Physioball bridges L/R back on ball   Green 15 ea Green 15 ea Green 30 ea Green 30 ea Green 30 ea      Disc slides fwd/side/bck L/R    10 ea 2x10 ea 2x10 ea 2x10 ea      TB kicks L/R for balance hip flex, abd, ext      Blue 15 ea Blue 15 ea      Bosu mini squats dome down "20 20                                Ther Ex    Bike   L1 5' L1 5' L3 5' L3 5' L3 5'      Dynamic leg swings flex/ext prior to running 3/27 15 ea 15 ea 15 ea HEP        Kneeling ant tib stretch R 3/27 20\" 3 standing 20\" 3 standing 20\" 3 standing 20\" 3 stanidng 20\" 3 Staniding 20\" 3      Standing R hip adductor stretch 3/27 20\" 3 20\" 3 20\" 3 20\" 3  20\" 3 20\" 3      Standing soleus stretch R 3/27 20\" 3 20\" 3 20\" 3 20\" 3 20\" 3 20\" 3                                                          Ther Activity    Running analysis        25'                  Gait Training                              Modalities                                        "

## 2023-04-26 NOTE — PROGRESS NOTES
Daily Note   Today's date: 2023  Patient name: Brendon France  : 1970  MRN: [id number]  Referring provider: Uriel Harrington MD  Dx:   Encounter Diagnosis     ICD-10-CM    1  Status post debridement  Z98 890                      Subjective: I aggrevated the wrist from mowing the lawn  Objective: See treatment diary below      Assessment: Tolerated treatment well  Patient has discomfort in the ulnar wrist   He has good wrist ROM  He is mindful of activities that irritate his wrist        Plan: Continue per plan of care  Precautions: Universal sx- 23,    Manuals 3/31 4/3 4/6 4/21 4/26      MWM wrist    2m 2m 2m      Scar mob  2m 2m 2m 2m      Graston L  4m 4m 4m 4m      STM extensors  4m 4m 4m 4m      Neuro Re-Ed           Tape midcarpal    apply        HEP- wrist A/AAROM, TGES                                                                  Ther Ex           Wrist PROM           Wrist AROM Foam roll stretch x10 x10 x10 x10      Wrist maze AROM    proprioception board x30 x30      Gripping-neutral BPW 2x30 BPW 2x30 BPW  2x10 BPW 2x10 BPW 2x30      handhelper w   Blocks- supinate    1 set   2 red 1 set 2 red      Ball circles- stability           weightbearing           P/S AROM  SM 3x10 Small  3x10 SM 3x10       P/S therabar R 3x10 R 3x10 R 3x10 G 3x10 R/G 3x10                            Gait Training                                 Modalities U/S  8 8m          MH R  10m 10m 10m 10m      CP post 6m 6m 6m 6m             Manuals 2/8 2/15 2/22 3/3 3/8 3/13 3/15 3/22 3/24 3/29    IE 30'            Scar mob 3m 3m 3m 3m 3m 3m 3m 3m 3m 3m   Graston L 4m 4m 4m 4m 4m 4m 4m 4m 4m 4m   STM extensors 4m 4m 4m 4m 4m 4m 4m 4m 4m 4m   Neuro Re-Ed                          HEP- wrist A/AAROM, TGES reviewed                                                                             Ther Ex             Wrist PROM 2m gentle 2m gentle 2m  2m 2m 2m 2m 2m 2m 2m Wrist AROM  Cone 3x10 Cone 3x10 #1 3x10 2#  3x10 #2/3 3x10 #2/3 3x10 3#  3x10 #3 3x10 #3 3x10   Wrist maze AROM  x5 x5  x5        Gripping-neutral  RPW 2x30 RPW 2x30    BPW 2x30 BPW  2x30 BPW 2x30 BPW 2x30   handhelper w   Blocks- supinate    2 red 1set  2red 1 set 2 red 1 set 2 red 1 set  red 1 set red 1 set red   Ball circles- stability    Y 2x10 Y 2x10 Y 2x10 Y 2x10 y 2x10 #1 2x10 #1 3x10   weightbearing     Blue powerweb Blue G/B x20 B x 20     P/S AROM      LG hammer 3x10 LG 3x10 LG  3x10 LG 3x10 LG 3x10   P/S therabar         R 3x10 R 3x10                             Gait Training                                       Modalities             MH R 10m 8m 8m 8m 8m 8m 8m 8m 8m 8m   CP post  6m 6m 6m 6m 6m 6m 6m 6m 6m

## 2023-04-28 ENCOUNTER — APPOINTMENT (OUTPATIENT)
Dept: OCCUPATIONAL THERAPY | Facility: CLINIC | Age: 53
End: 2023-04-28

## 2023-05-03 ENCOUNTER — APPOINTMENT (OUTPATIENT)
Dept: OCCUPATIONAL THERAPY | Facility: CLINIC | Age: 53
End: 2023-05-03
Payer: COMMERCIAL

## 2023-05-05 ENCOUNTER — APPOINTMENT (OUTPATIENT)
Dept: OCCUPATIONAL THERAPY | Facility: CLINIC | Age: 53
End: 2023-05-05
Payer: COMMERCIAL

## 2023-05-10 ENCOUNTER — APPOINTMENT (OUTPATIENT)
Dept: OCCUPATIONAL THERAPY | Facility: CLINIC | Age: 53
End: 2023-05-10
Payer: COMMERCIAL

## 2023-05-12 ENCOUNTER — APPOINTMENT (OUTPATIENT)
Dept: OCCUPATIONAL THERAPY | Facility: CLINIC | Age: 53
End: 2023-05-12
Payer: COMMERCIAL

## 2023-05-15 ENCOUNTER — APPOINTMENT (OUTPATIENT)
Dept: OCCUPATIONAL THERAPY | Facility: CLINIC | Age: 53
End: 2023-05-15
Payer: COMMERCIAL

## 2023-05-19 ENCOUNTER — OFFICE VISIT (OUTPATIENT)
Dept: OCCUPATIONAL THERAPY | Facility: CLINIC | Age: 53
End: 2023-05-19

## 2023-05-19 DIAGNOSIS — Z98.890 STATUS POST DEBRIDEMENT: Primary | ICD-10-CM

## 2023-05-19 NOTE — PROGRESS NOTES
Daily Note / Discharge  Today's date: 2023  Patient name: Marie De La Fuente  : 1970  MRN: [id number]  Referring provider: Luly Gordon MD  Dx:   Encounter Diagnosis     ICD-10-CM    1  Status post debridement  Z98 890                      Subjective: I took therapy off because it was aggrevating it  Objective: See treatment diary below      Assessment: Tolerated treatment well  Patient returns to therapy after a hiatus of tx per patient  Pt  Symptoms were being aggrevated with tx  He has noticed less symptoms with modifications of his exercises and daily activities  He continues to have wrist irritations with over typing and computer mouse use  His  strength has slowly improved to 45lbs, L 80lbs  Will discontinue formal tx at this time and pt  glenny lcontinue with behavior modifcations and progressive strengthening on an independent routine  FOTO score has been achieved  Plan: Discontinue tx  F/U with MD      Precautions: Universal sx- 23,    Manuals 3/31 4/3 4/6 4/21 4/26 5 19     MWM wrist    2m 2m 2m      Scar mob  2m 2m 2m 2m      Graston L  4m 4m 4m 4m      STM extensors  4m 4m 4m 4m      Neuro Re-Ed           Tape midcarpal    apply   reeval     HEP- wrist A/AAROM, TGES                                                                  Ther Ex           Wrist PROM           Wrist AROM Foam roll stretch x10 x10 x10 x10      Wrist maze AROM    proprioception board x30 x30      Gripping-neutral BPW 2x30 BPW 2x30 BPW  2x10 BPW 2x10 BPW 2x30      handhelper w   Blocks- supinate    1 set   2 red 1 set 2 red      Ball circles- stability           weightbearing           P/S AROM  SM 3x10 Small  3x10 SM 3x10       P/S therabar R 3x10 R 3x10 R 3x10 G 3x10 R/G 3x10                            Gait Training                                 Modalities U/S  8 8m          MH R  10m 10m 10m 10m      CP post 6m 6m 6m 6m             Manuals  2/15 2/22 3/3 3/8 3/13 3/15 3/22 3/24 3/29    IE 30'            Scar mob 3m 3m 3m 3m 3m 3m 3m 3m 3m 3m   Graston L 4m 4m 4m 4m 4m 4m 4m 4m 4m 4m   STM extensors 4m 4m 4m 4m 4m 4m 4m 4m 4m 4m   Neuro Re-Ed                          HEP- wrist A/AAROM, TGES reviewed                                                                             Ther Ex             Wrist PROM 2m gentle 2m gentle 2m  2m 2m 2m 2m 2m 2m 2m   Wrist AROM  Cone 3x10 Cone 3x10 #1 3x10 2#  3x10 #2/3 3x10 #2/3 3x10 3#  3x10 #3 3x10 #3 3x10   Wrist maze AROM  x5 x5  x5        Gripping-neutral  RPW 2x30 RPW 2x30    BPW 2x30 BPW  2x30 BPW 2x30 BPW 2x30   handhelper w   Blocks- supinate    2 red 1set  2red 1 set 2 red 1 set 2 red 1 set  red 1 set red 1 set red   Ball circles- stability    Y 2x10 Y 2x10 Y 2x10 Y 2x10 y 2x10 #1 2x10 #1 3x10   weightbearing     Blue powerweb Blue G/B x20 B x 20     P/S AROM      LG hammer 3x10 LG 3x10 LG  3x10 LG 3x10 LG 3x10   P/S therabar         R 3x10 R 3x10                             Gait Training                                       Modalities             MH R 10m 8m 8m 8m 8m 8m 8m 8m 8m 8m   CP post  6m 6m 6m 6m 6m 6m 6m 6m 6m

## 2023-06-21 ENCOUNTER — HOSPITAL ENCOUNTER (OUTPATIENT)
Dept: RADIOLOGY | Age: 53
Discharge: HOME | End: 2023-06-21
Payer: COMMERCIAL

## 2023-06-21 DIAGNOSIS — M54.50 LOW BACK PAIN: ICD-10-CM

## 2023-06-21 PROCEDURE — G1004 CDSM NDSC: HCPCS

## 2023-06-21 PROCEDURE — 72148 MRI LUMBAR SPINE W/O DYE: CPT

## 2024-02-09 ENCOUNTER — EVALUATION (OUTPATIENT)
Dept: PHYSICAL THERAPY | Facility: CLINIC | Age: 54
End: 2024-02-09
Payer: COMMERCIAL

## 2024-02-09 DIAGNOSIS — M17.11 OSTEOARTHRITIS OF RIGHT KNEE, UNSPECIFIED OSTEOARTHRITIS TYPE: ICD-10-CM

## 2024-02-09 DIAGNOSIS — R11.0 CHRONIC NAUSEA: ICD-10-CM

## 2024-02-09 DIAGNOSIS — M17.12 OSTEOARTHRITIS OF LEFT KNEE, UNSPECIFIED OSTEOARTHRITIS TYPE: Primary | ICD-10-CM

## 2024-02-09 PROCEDURE — 97161 PT EVAL LOW COMPLEX 20 MIN: CPT | Performed by: PHYSICAL THERAPIST

## 2024-02-09 PROCEDURE — 97110 THERAPEUTIC EXERCISES: CPT | Performed by: PHYSICAL THERAPIST

## 2024-02-09 NOTE — LETTER
2024    Edilberto Stanton MD  [address] [place] [address]    Patient: Wayne Callahan   YOB: 1970   Date of Visit: 2024     Encounter Diagnosis     ICD-10-CM    1. Osteoarthritis of left knee, unspecified osteoarthritis type  M17.12       2. Osteoarthritis of right knee, unspecified osteoarthritis type  M17.11       3. Chronic nausea  R11.0 NM gastric emptying          Dear Dr. Stanton:    Thank you for your recent referral of Wayne Callahan. Please review the attached evaluation summary from Wayne's recent visit.     Please verify that you agree with the plan of care by signing the attached order.     If you have any questions or concerns, please do not hesitate to call.     I sincerely appreciate the opportunity to share in the care of one of your patients and hope to have another opportunity to work with you in the near future.       Sincerely,    Donn Mayorga, PT      Referring Provider:      I certify that I have read the below Plan of Care and certify the need for these services furnished under this plan of treatment while under my care.                    Edilberto Stanton MD  [address] [place] [address]  Via Fax: [phone]          PT Evaluation     Today's date: 2024  Patient name: Wayne Callahan  : 1970  MRN: [id number]  Referring provider: Edilberto Stanton MD  Dx:   Encounter Diagnosis     ICD-10-CM    1. Osteoarthritis of left knee, unspecified osteoarthritis type  M17.12       2. Osteoarthritis of right knee, unspecified osteoarthritis type  M17.11       3. Chronic nausea  R11.0 NM gastric emptying                       Assessment  Assessment details: Wayne Callahan is a 53 y.o. male presenting to outpatient physical therapy at [facility] with complaints of R>L medial knee pain.  He presents with decreased R quadriceps strength, limited flexibility, decreased tolerance to activity and decreased functional mobility due to B knee  OA.  He has significant atrophy in B VMo.  He would benefit from skilled PT services in order to address these deficits and reach maximum level of function.  Thank you for the referral!  Impairments: abnormal or restricted ROM, activity intolerance, impaired physical strength, lacks appropriate home exercise program and pain with function  Barriers to therapy: None  Understanding of Dx/Px/POC: excellent  Goals  ST.  Independent with HEP in 2 weeks  2.  Increase B rectus femoris and ITB flexibility to WNL in 3 weeks     LT.  Achieve FOTO score of 76/100 in 8 weeks   2.  Able to run x 3 miles without knee pain in 8 weeks  3.  Strength B quadriceps = 5/5 in 8 weeks      Plan  Patient would benefit from: skilled PT and PT eval  Planned modality interventions: cryotherapy  Planned therapy interventions: ADL retraining, flexibility, functional ROM exercises, home exercise program, joint mobilization, manual therapy, neuromuscular re-education, strengthening, stretching, therapeutic activities and therapeutic exercise  Frequency: 2x week  Duration in weeks: 8  Treatment plan discussed with: patient      Subjective Evaluation    History of Present Illness  Mechanism of injury: Pt reports having B knee pain if he exercises > 30 min running, treadmill or using a stairmaster.  He has swelling following that subsides by the next day.  He has hx of chronic anterior R hip and R anterior-lateral ankle pain.  Enjoys running 5k and 10k races.            Recurrent probem    Quality of life: good    Patient Goals  Patient goals for therapy: decreased pain, increased motion, increased strength and return to sport/leisure activities    Pain  Current pain ratin  At best pain ratin  At worst pain ratin  Quality: dull ache, throbbing and tight  Relieving factors: rest  Aggravating factors: running (squatting)  Progression: worsening    Social Support  Steps to enter house: yes  Stairs in house: yes   Lives in:  multiple-level home  Lives with: spouse    Employment status: working ()    Diagnostic Tests  No diagnostic tests performed  Treatments  No previous or current treatments      Objective     Palpation   Left   No palpable tenderness to the TFL.   Hypertonic in the rectus femoris.     Right   Hypertonic in the rectus femoris and TFL.     Tenderness   Left Knee   No tenderness in the lateral joint line and medial joint line.     Right Knee   No tenderness in the lateral joint line, LCL (proximal), MCL (proximal) and medial joint line.     Neurological Testing     Sensation     Hip   Left Hip   Intact: light touch    Right Hip   Intact: light touch    Active Range of Motion   Left Hip   Normal active range of motion    Right Hip   Normal active range of motion  Left Knee   Normal active range of motion    Right Knee   Normal active range of motion    Mobility   Patellar Mobility:   Left Knee   WFL: medial and lateral.     Right Knee   WFL: medial  Hypermobile: lateral     Strength/Myotome Testing     Left Hip   Normal muscle strength    Right Hip   Normal muscle strength    Left Knee   Flexion: 5  Extension: 4+  Quadriceps contraction: good    Right Knee   Flexion: 4+  Extension: 4  Quadriceps contraction: good    Tests     Right Hip   Juan: Positive.     Right Knee   Negative anterior drawer, anterior Lachman, lateral Radha, medial Radha, valgus stress test at 0 degrees and varus stress test at 0 degrees.     Ambulation     Ambulation: Level Surfaces   Ambulation without assistive device: independent      Flowsheet Rows      Flowsheet Row Most Recent Value   PT/OT G-Codes    Current Score 67   Projected Score 76          POC EXPIRES On:  4/5/24  PRECAUTIONS:  None  CO-MORBIDITES:  None  PERSONAL FACTORS:  None  Access Code [email or web address]    Manuals HEP 2/9                                                               Neuro Re-Ed                                                                              "                   Ther Ex    Dynamic leg swings flex/ext prior to running 2/9 15 ea           Strap supine ITB stretch L/R 2/9 20\" 5           Prone rectus femoris stretch L/R 2/9 20\" 5           Supine SLR L/R with slight hip ER 2/9 20 ea           Bridging with ball adduction 2/9 20 ea                                                  Ther Activity                              Gait Training                              Modalities                                                        "

## 2024-02-09 NOTE — PROGRESS NOTES
PT Evaluation     Today's date: 2024  Patient name: Wayne Callahan  : 1970  MRN: [id number]  Referring provider: Edilberto Stanton MD  Dx:   Encounter Diagnosis     ICD-10-CM    1. Osteoarthritis of left knee, unspecified osteoarthritis type  M17.12       2. Osteoarthritis of right knee, unspecified osteoarthritis type  M17.11       3. Chronic nausea  R11.0 NM gastric emptying                       Assessment  Assessment details: Wayne Callahan is a 53 y.o. male presenting to outpatient physical therapy at [facility] with complaints of R>L medial knee pain.  He presents with decreased R quadriceps strength, limited flexibility, decreased tolerance to activity and decreased functional mobility due to B knee OA.  He has significant atrophy in B VMo.  He would benefit from skilled PT services in order to address these deficits and reach maximum level of function.  Thank you for the referral!  Impairments: abnormal or restricted ROM, activity intolerance, impaired physical strength, lacks appropriate home exercise program and pain with function  Barriers to therapy: None  Understanding of Dx/Px/POC: excellent  Goals  ST.  Independent with HEP in 2 weeks  2.  Increase B rectus femoris and ITB flexibility to WNL in 3 weeks     LT.  Achieve FOTO score of 76/100 in 8 weeks   2.  Able to run x 3 miles without knee pain in 8 weeks  3.  Strength B quadriceps = 5/5 in 8 weeks      Plan  Patient would benefit from: skilled PT and PT eval  Planned modality interventions: cryotherapy  Planned therapy interventions: ADL retraining, flexibility, functional ROM exercises, home exercise program, joint mobilization, manual therapy, neuromuscular re-education, strengthening, stretching, therapeutic activities and therapeutic exercise  Frequency: 2x week  Duration in weeks: 8  Treatment plan discussed with: patient      Subjective Evaluation    History of Present Illness  Mechanism of injury: Pt reports having B knee pain if  he exercises > 30 min running, treadmill or using a stairmaster.  He has swelling following that subsides by the next day.  He has hx of chronic anterior R hip and R anterior-lateral ankle pain.  Enjoys running 5k and 10k races.            Recurrent probem    Quality of life: good    Patient Goals  Patient goals for therapy: decreased pain, increased motion, increased strength and return to sport/leisure activities    Pain  Current pain ratin  At best pain ratin  At worst pain ratin  Quality: dull ache, throbbing and tight  Relieving factors: rest  Aggravating factors: running (squatting)  Progression: worsening    Social Support  Steps to enter house: yes  Stairs in house: yes   Lives in: multiple-level home  Lives with: spouse    Employment status: working ()    Diagnostic Tests  No diagnostic tests performed  Treatments  No previous or current treatments      Objective     Palpation   Left   No palpable tenderness to the TFL.   Hypertonic in the rectus femoris.     Right   Hypertonic in the rectus femoris and TFL.     Tenderness   Left Knee   No tenderness in the lateral joint line and medial joint line.     Right Knee   No tenderness in the lateral joint line, LCL (proximal), MCL (proximal) and medial joint line.     Neurological Testing     Sensation     Hip   Left Hip   Intact: light touch    Right Hip   Intact: light touch    Active Range of Motion   Left Hip   Normal active range of motion    Right Hip   Normal active range of motion  Left Knee   Normal active range of motion    Right Knee   Normal active range of motion    Mobility   Patellar Mobility:   Left Knee   WFL: medial and lateral.     Right Knee   WFL: medial  Hypermobile: lateral     Strength/Myotome Testing     Left Hip   Normal muscle strength    Right Hip   Normal muscle strength    Left Knee   Flexion: 5  Extension: 4+  Quadriceps contraction: good    Right Knee   Flexion: 4+  Extension: 4  Quadriceps contraction:  "good    Tests     Right Hip   Juan: Positive.     Right Knee   Negative anterior drawer, anterior Lachman, lateral Radha, medial Radha, valgus stress test at 0 degrees and varus stress test at 0 degrees.     Ambulation     Ambulation: Level Surfaces   Ambulation without assistive device: independent      Flowsheet Rows      Flowsheet Row Most Recent Value   PT/OT G-Codes    Current Score 67   Projected Score 76          POC EXPIRES On:  4/5/24  PRECAUTIONS:  None  CO-MORBIDITES:  None  PERSONAL FACTORS:  None  Access Code [email or web address]    Manuals HEP 2/9                                                               Neuro Re-Ed                                                                                                Ther Ex    Dynamic leg swings flex/ext prior to running 2/9 15 ea           Strap supine ITB stretch L/R 2/9 20\" 5           Prone rectus femoris stretch L/R 2/9 20\" 5           Supine SLR L/R with slight hip ER 2/9 20 ea           Bridging with ball adduction 2/9 20 ea                                                  Ther Activity                              Gait Training                              Modalities                                        "

## 2024-02-15 ENCOUNTER — OFFICE VISIT (OUTPATIENT)
Dept: PHYSICAL THERAPY | Facility: CLINIC | Age: 54
End: 2024-02-15
Payer: COMMERCIAL

## 2024-02-15 DIAGNOSIS — M17.11 OSTEOARTHRITIS OF RIGHT KNEE, UNSPECIFIED OSTEOARTHRITIS TYPE: ICD-10-CM

## 2024-02-15 DIAGNOSIS — M17.12 OSTEOARTHRITIS OF LEFT KNEE, UNSPECIFIED OSTEOARTHRITIS TYPE: Primary | ICD-10-CM

## 2024-02-15 PROCEDURE — 97110 THERAPEUTIC EXERCISES: CPT | Performed by: PHYSICAL THERAPIST

## 2024-02-15 NOTE — PROGRESS NOTES
"Daily Note     Today's date: 2/15/2024  Patient name: Wayne Callahan  : 1970  MRN: [id number]  Referring provider: Edilberto Stanton MD  Dx:   Encounter Diagnosis     ICD-10-CM    1. Osteoarthritis of left knee, unspecified osteoarthritis type  M17.12       2. Osteoarthritis of right knee, unspecified osteoarthritis type  M17.11                      Subjective:  Pt reports feeling a little medial knee pain with bridges at top of range.  Otherwise feeling OK.       Objective:  See treatment diary below      Assessment:  Pt presented to outpatient physical therapy at [facility] with complaints of R>L medial knee pain.  He presents with decreased R quadriceps strength, limited flexibility, decreased tolerance to activity and decreased functional mobility due to B knee OA.  He has significant atrophy in B VMo.  He will continue to benefit from skilled PT services in order to address these deficits and reach maximum level of function.  Good tolerance to increased activity today as long as he didn't lock his knees into ext.      Plan:  Continue to strengthen B quadriceps.  Last PT session scheduled on 24.        POC EXPIRES On:  24  PRECAUTIONS:  None  CO-MORBIDITES:  None  PERSONAL FACTORS:  None  Access Code [email or web address]    Manuals HEP 2/9 2/15                                                              Neuro Re-Ed                                                                                                Ther Ex    Dynamic leg swings flex/ext prior to running  15 ea           Strap supine ITB stretch L/R  20\" 5 20\" 5 ea          Prone rectus femoris stretch L/R  20\" 5 20\" 5 ea          Supine SLR L/R with slight hip ER  20 ea 3# 3x10 ea          Bridging with ball adduction 20  20          Bike   L3 8'          Leg ext machine B   25# 3x10          Leg ext machine L/R   15# 2x10 ea          Ther Activity                              Gait Training                              Modalities       "

## 2024-02-23 ENCOUNTER — OFFICE VISIT (OUTPATIENT)
Dept: PHYSICAL THERAPY | Facility: CLINIC | Age: 54
End: 2024-02-23
Payer: COMMERCIAL

## 2024-02-23 DIAGNOSIS — M17.11 OSTEOARTHRITIS OF RIGHT KNEE, UNSPECIFIED OSTEOARTHRITIS TYPE: ICD-10-CM

## 2024-02-23 DIAGNOSIS — M17.12 OSTEOARTHRITIS OF LEFT KNEE, UNSPECIFIED OSTEOARTHRITIS TYPE: Primary | ICD-10-CM

## 2024-02-23 PROCEDURE — 97110 THERAPEUTIC EXERCISES: CPT | Performed by: PHYSICAL THERAPIST

## 2024-02-23 NOTE — PROGRESS NOTES
"Daily Note     Today's date: 2024  Patient name: Wayne Callahan  : 1970  MRN: [id number]  Referring provider: Edilberto Stanton MD  Dx:   Encounter Diagnosis     ICD-10-CM    1. Osteoarthritis of left knee, unspecified osteoarthritis type  M17.12       2. Osteoarthritis of right knee, unspecified osteoarthritis type  M17.11                      Subjective:  Pt reports feeling better.  Able to run a little without pain.  Gets stiff with sitting too long.  Movement feels better.         Objective:  See treatment diary below      Assessment:  Pt presented to outpatient physical therapy at [facility] with complaints of R>L medial knee pain.  He presented with decreased R quadriceps strength, limited flexibility, decreased tolerance to activity and decreased functional mobility due to B knee OA.  He had significant atrophy in B VMo that has improved with PT.  He will continue to benefit from skilled PT services in order to address these deficits and reach maximum level of function.  Good tolerance to increased activity today as long as he didn't lock his knees into ext.  Able to run for short distances without knee pain.      Plan:  Continue to strengthen B quadriceps.  Last PT session scheduled on 3/4/24.        POC EXPIRES On:  24  PRECAUTIONS:  None  CO-MORBIDITES:  None  PERSONAL FACTORS:  None  Access Code [email or web address]    Manuals HEP 2/9 2/15 2/23                                                             Neuro Re-Ed                                                                                                Ther Ex    Dynamic leg swings flex/ext prior to running  15 ea           Strap supine ITB stretch L/R  20\" 5 20\" 5 ea 20\" 5 ea         Prone rectus femoris stretch L/R  20\" 5 20\" 5 ea 20\" 5 ea         Supine SLR L/R with slight hip ER  20 ea 3# 3x10 ea 3# 3x10 ea         Bridging with ball adduction  20  20 20         Bike   L3 8' L3 10'         Leg ext machine B   25# 3x10 25# 3x10     "     Leg ext machine L/R   15# 2x10 ea 15# 2x10 ea         Ther Activity                              Gait Training                              Modalities

## 2024-03-04 ENCOUNTER — OFFICE VISIT (OUTPATIENT)
Dept: PHYSICAL THERAPY | Facility: CLINIC | Age: 54
End: 2024-03-04
Payer: COMMERCIAL

## 2024-03-04 DIAGNOSIS — M17.11 OSTEOARTHRITIS OF RIGHT KNEE, UNSPECIFIED OSTEOARTHRITIS TYPE: ICD-10-CM

## 2024-03-04 DIAGNOSIS — M17.12 OSTEOARTHRITIS OF LEFT KNEE, UNSPECIFIED OSTEOARTHRITIS TYPE: Primary | ICD-10-CM

## 2024-03-04 PROCEDURE — 97110 THERAPEUTIC EXERCISES: CPT | Performed by: PHYSICAL THERAPIST

## 2024-03-04 NOTE — PROGRESS NOTES
"Daily Note     Today's date: 3/4/2024  Patient name: Wayne Callahan  : 1970  MRN: [id number]  Referring provider: Edilberto Stanton MD  Dx:   Encounter Diagnosis     ICD-10-CM    1. Osteoarthritis of left knee, unspecified osteoarthritis type  M17.12       2. Osteoarthritis of right knee, unspecified osteoarthritis type  M17.11                      Subjective:  Pt reports feeling better.  Ran intervals to a total of 17 min and was a little sore after but didn't last > 24 hours.  Did stair climbing in the same workout though.            Objective:  See treatment diary below      Assessment:  Pt presented to outpatient physical therapy at [facility] with complaints of R>L medial knee pain.  He presented with decreased R quadriceps strength, limited flexibility, decreased tolerance to activity and decreased functional mobility due to B knee OA.  He had significant atrophy in B VMo that has improved significantly with PT.  Still min soreness after exercise but lessening and not lasting as long anymore.  He will continue to benefit from skilled PT services in order to address these deficits and reach maximum level of function.  Good tolerance to increased activity today as long as he didn't lock his knees into ext.  Able to run for short distances without any knee pain.      Plan:  Continue to strengthen B quadriceps.  PT prn.        POC EXPIRES On:  24  PRECAUTIONS:  None  CO-MORBIDITES:  None  PERSONAL FACTORS:  None  Access Code [email or web address]    Manuals HEP 2/9 2/15 2/23 3/4                                                            Neuro Re-Ed                                                                                                Ther Ex    Dynamic leg swings flex/ext prior to running  15 ea           Strap supine ITB stretch L/R  20\" 5 20\" 5 ea 20\" 5 ea 20\" 5 ea        Prone rectus femoris stretch L/R  20\" 5 20\" 5 ea 20\" 5 ea 20\" 5 ea        Supine SLR L/R with slight hip ER  20 ea 3# 3x10 ea 3# " 3x10 ea 4# 3x10 ea        Bridging with ball adduction 2/9 20  20 20 20        Bike   L3 8' L3 10' L3 10'        Leg ext machine B   25# 3x10 25# 3x10 25# 3x10        Leg ext machine L/R   15# 2x10 ea 15# 2x10 ea 15# 2x10 ea        Ther Activity                              Gait Training                              Modalities

## 2024-10-03 ENCOUNTER — APPOINTMENT (OUTPATIENT)
Dept: URGENT CARE | Facility: CLINIC | Age: 54
End: 2024-10-03

## 2025-01-15 ENCOUNTER — PREP FOR PROCEDURE (OUTPATIENT)
Age: 55
End: 2025-01-15

## 2025-01-15 ENCOUNTER — TELEPHONE (OUTPATIENT)
Age: 55
End: 2025-01-15

## 2025-01-15 DIAGNOSIS — Z80.0 FAMILY HISTORY OF COLON CANCER: Primary | ICD-10-CM

## 2025-01-15 NOTE — TELEPHONE ENCOUNTER
01/15/25  Screened by: Sara Corea    Referring Provider Dr. Miller    Pre- Screening:     There is no height or weight on file to calculate BMI.  Has patient been referred for a routine screening Colonoscopy? yes  Is the patient between 45-75 years old? yes      Previous Colonoscopy yes   If yes:    Date: 2019    Facility:     Reason:         Does the patient want to see a Gastroenterologist prior to their procedure OR are they having any GI symptoms? no    Has the patient been hospitalized or had abdominal surgery in the past 6 months? no    Does the patient use supplemental oxygen? no    Does the patient take Coumadin, Lovenox, Plavix, Elliquis, Xarelto, or other blood thinning medication? no    Has the patient had a stroke, cardiac event, or stent placed in the past year? no        If patient is between 45yrs - 49yrs, please advise patient that we will have to confirm benefits & coverage with their insurance company for a routine screening colonoscopy.

## 2025-01-15 NOTE — TELEPHONE ENCOUNTER
Scheduled date of colonoscopy (as of today): 03/28/25  Physician performing colonoscopy: Dr. White  Location of colonoscopy: [facility]  Bowel prep reviewed with patient: Email: [email or web address]  Instructions reviewed with patient by:Robert  Clearances:

## 2025-01-20 ENCOUNTER — TELEPHONE (OUTPATIENT)
Age: 55
End: 2025-01-20

## 2025-01-20 NOTE — TELEPHONE ENCOUNTER
Pt. And pt. Spouse calling to schedule a consult with GI provider prior to scheduled colonoscopy, pt. Did not have a referral to make colonoscopy appointment and they were asking about how colonoscopy would be coded, insurance will only cover diagnostic and not preventative screening, advised it would be up to the provider who is doing the colonoscopy how it is coded, pt. Did not have referral for colonoscopy ad was just scheduled, pt. Wife would like pt. To speak with someone prior to procedure to see if this colonoscopy is needed and if it would be covered by insurance , appointment made

## 2025-02-17 ENCOUNTER — EVALUATION (OUTPATIENT)
Dept: PHYSICAL THERAPY | Facility: CLINIC | Age: 55
End: 2025-02-17
Payer: COMMERCIAL

## 2025-02-17 DIAGNOSIS — M77.02 MEDIAL EPICONDYLITIS OF ELBOW, LEFT: ICD-10-CM

## 2025-02-17 DIAGNOSIS — S76.312D STRAIN OF LEFT PIRIFORMIS MUSCLE, SUBSEQUENT ENCOUNTER: Primary | ICD-10-CM

## 2025-02-17 PROCEDURE — 97161 PT EVAL LOW COMPLEX 20 MIN: CPT | Performed by: PHYSICAL THERAPIST

## 2025-02-17 PROCEDURE — 97110 THERAPEUTIC EXERCISES: CPT | Performed by: PHYSICAL THERAPIST

## 2025-02-17 NOTE — PROGRESS NOTES
PT Evaluation     Today's date: 2025  Patient name: Wayne Callahan  : 1970  MRN: [id number]  Referring provider: Edilberto Stanton MD  Dx:   Encounter Diagnosis     ICD-10-CM    1. Strain of left piriformis muscle, subsequent encounter  S76.312D       2. Medial epicondylitis of elbow, left  M77.02                  Assessment  Impairments: activity intolerance, impaired physical strength, lacks appropriate home exercise program and pain with function  Symptom irritability: moderate    Assessment details: Wayne Callahan is a 54 y.o. male presenting to outpatient physical therapy at [facility] with complaints of L upper H/S and posterior-lateral elbow pain.  He presents with limited flexibility, poor STM, decreased tolerance to activity and decreased functional mobility due to Strain of left piriformis muscle with upper hamstring tightness, medial epicondylitis of elbow, left that has shifted to distal L triceps.  He would benefit from skilled PT services in order to address these deficits and reach maximum level of function.  Thank you for the referral!    Barriers to therapy: None  Understanding of Dx/Px/POC: excellent     Prognosis: excellent    Goals  ST.  Independent with HEP in 2 weeks  2.  Increase L H/S and L triceps flexibility to WNL in 3 weeks     LT.  Achieve FOTO score of 71/100 in 8 weeks   2.  Able to run x 3 miles without L LE pain in 8 weeks  3.  Able to perform a push up without L UE pain in 8 weeks      Plan  Patient would benefit from: skilled PT and PT eval  Planned modality interventions: cryotherapy    Planned therapy interventions: ADL retraining, flexibility, functional ROM exercises, home exercise program, joint mobilization, manual therapy, neuromuscular re-education, strengthening, stretching, therapeutic activities, therapeutic exercise and gait training    Frequency: 2x week  Duration in weeks: 8  Treatment plan discussed with: patient        Subjective Evaluation    History  of Present Illness  Mechanism of injury: Pt reports having L ischial tuberosity pain since 24 after running a 5k.  Felt fine after running, but soon after that starting having pain.  He was running well after doing PT for knee pain last year.  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  Unable to run or lift heavy items due to glut pain.  He has hx of B wrist surgery.  L medial elbow pain began in 2024 after trying to do push ups on his knees.  Now that pain is more lateral and posterior.  Tried a prednisone taper with min less elbow pain, but not L glut.            Recurrent probem    Quality of life: good    Patient Goals  Patient goals for therapy: decreased pain, increased motion, increased strength and return to sport/leisure activities    Pain  Current pain ratin  At best pain ratin  At worst pain ratin  Quality: dull ache, tight and sharp  Relieving factors: rest and medications  Aggravating factors: running (squatting)  Progression: worsening    Social Support  Steps to enter house: yes  Stairs in house: yes   Lives in: multiple-level home  Lives with: spouse    Employment status: working  Hand dominance: left      Diagnostic Tests  X-ray: normal  Treatments  Previous treatment: medication      Objective     Observations     Left Elbow   Negative for effusion.     Palpation   Left   Hypertonic in the triceps.   Tenderness of the triceps.     Tenderness     Left Elbow   Tenderness in the distal triceps tendon. No tenderness in the lateral epicondyle, medial epicondyle and olecranon process.     Left Wrist/Hand   Tenderness in the distal triceps tendon. No tenderness in the lateral epicondyle, medial epicondyle and olecranon process.     Left Hip   Tenderness in the ischial tuberosity. No tenderness in the greater trochanter.     Lumbar Screen  Lumbar range of motion within normal limits.    Neurological Testing     Sensation     Elbow   Left  "Elbow   Intact: light touch    Right Elbow   Intact: light touch    Hip   Left Hip   Intact: light touch    Right Hip   Intact: light touch    Active Range of Motion     Left Elbow   Normal active range of motion    Left Wrist   Normal active range of motion  Left Hip   Normal active range of motion    Right Hip   Normal active range of motion    Passive Range of Motion     Additional Passive Range of Motion Details  Mod tightness L H/S, none L piriformis.     Strength/Myotome Testing     Left Elbow   Normal strength    Left Wrist/Hand   Normal wrist strength    Left Hip   Normal muscle strength    Right Hip   Normal muscle strength          Precautions: None  CO-MORBIDITIES:  None  HEP ACCESS CODE:   FOTO Completed On:     POC Expires Reeval for Medicare to be completed Unit LImit Auth Expiration Date PT/OT/STVisit Limit   4/14/25 By visit  N/A N/A 1/31/26 30                     TREATMENT DIARY  Auth Status DATE 2/17        Approved Visit # 1         Remain 29        MANUAL THERAPY         Graston distal L triceps 5'                                                     THERAPEUTIC EXERCISE HEP         Doorway L H/S C/R stretch 2/17 20\" 5        Strap supine L H/S - ITB stretch 2/17 20\" 5        Supine crossed leg stretch to R only 2/17 20\" 5        L wrist flex/ext stretch 2/17 20\" 3 ea                                                                                                                      NEUROMUSCULAR REEDUCATION                                                                                                                                                       THERAPEUTIC ACTIVITY                                                  GAIT TRAINING                                                  MODALITIES                                 "

## 2025-02-17 NOTE — LETTER
2025    Edilberto Stanton MD  [address] [place] [address]    Patient: Wayne Callahan   YOB: 1970   Date of Visit: 2025     Encounter Diagnosis     ICD-10-CM    1. Strain of left piriformis muscle, subsequent encounter  S76.312D       2. Medial epicondylitis of elbow, left  M77.02           Dear Dr. Stanton:    Thank you for your recent referral of Wayne Callahan. Please review the attached evaluation summary from Wayne's recent visit.     Please verify that you agree with the plan of care by signing the attached order.     If you have any questions or concerns, please do not hesitate to call.     I sincerely appreciate the opportunity to share in the care of one of your patients and hope to have another opportunity to work with you in the near future.       Sincerely,    Donn Mayorga, PT      Referring Provider:      I certify that I have read the below Plan of Care and certify the need for these services furnished under this plan of treatment while under my care.                    Edilberto Stanton MD  [address] [place] [address]  Via Fax: [phone]          PT Evaluation     Today's date: 2025  Patient name: Wayne Callahan  : 1970  MRN: [id number]  Referring provider: Edilberto Stanton MD  Dx:   Encounter Diagnosis     ICD-10-CM    1. Strain of left piriformis muscle, subsequent encounter  S76.312D       2. Medial epicondylitis of elbow, left  M77.02                  Assessment  Impairments: activity intolerance, impaired physical strength, lacks appropriate home exercise program and pain with function  Symptom irritability: moderate    Assessment details: Wayne Callahan is a 54 y.o. male presenting to outpatient physical therapy at [facility] with complaints of L upper H/S and posterior-lateral elbow pain.  He presents with limited flexibility, poor STM, decreased tolerance to activity and decreased functional mobility due to Strain  of left piriformis muscle with upper hamstring tightness, medial epicondylitis of elbow, left that has shifted to distal L triceps.  He would benefit from skilled PT services in order to address these deficits and reach maximum level of function.  Thank you for the referral!    Barriers to therapy: None  Understanding of Dx/Px/POC: excellent     Prognosis: excellent    Goals  ST.  Independent with HEP in 2 weeks  2.  Increase L H/S and L triceps flexibility to WNL in 3 weeks     LT.  Achieve FOTO score of 71/100 in 8 weeks   2.  Able to run x 3 miles without L LE pain in 8 weeks  3.  Able to perform a push up without L UE pain in 8 weeks      Plan  Patient would benefit from: skilled PT and PT eval  Planned modality interventions: cryotherapy    Planned therapy interventions: ADL retraining, flexibility, functional ROM exercises, home exercise program, joint mobilization, manual therapy, neuromuscular re-education, strengthening, stretching, therapeutic activities, therapeutic exercise and gait training    Frequency: 2x week  Duration in weeks: 8  Treatment plan discussed with: patient        Subjective Evaluation    History of Present Illness  Mechanism of injury: Pt reports having L ischial tuberosity pain since 24 after running a 5k.  Felt fine after running, but soon after that starting having pain.  He was running well after doing PT for knee pain last year.  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  Unable to run or lift heavy items due to glut pain.  He has hx of B wrist surgery.  L medial elbow pain began in 2024 after trying to do push ups on his knees.  Now that pain is more lateral and posterior.  Tried a prednisone taper with min less elbow pain, but not L glut.            Recurrent probem    Quality of life: good    Patient Goals  Patient goals for therapy: decreased pain, increased motion, increased strength and return to sport/leisure  activities    Pain  Current pain ratin  At best pain ratin  At worst pain ratin  Quality: dull ache, tight and sharp  Relieving factors: rest and medications  Aggravating factors: running (squatting)  Progression: worsening    Social Support  Steps to enter house: yes  Stairs in house: yes   Lives in: multiple-level home  Lives with: spouse    Employment status: working  Hand dominance: left      Diagnostic Tests  X-ray: normal  Treatments  Previous treatment: medication      Objective     Observations     Left Elbow   Negative for effusion.     Palpation   Left   Hypertonic in the triceps.   Tenderness of the triceps.     Tenderness     Left Elbow   Tenderness in the distal triceps tendon. No tenderness in the lateral epicondyle, medial epicondyle and olecranon process.     Left Wrist/Hand   Tenderness in the distal triceps tendon. No tenderness in the lateral epicondyle, medial epicondyle and olecranon process.     Left Hip   Tenderness in the ischial tuberosity. No tenderness in the greater trochanter.     Lumbar Screen  Lumbar range of motion within normal limits.    Neurological Testing     Sensation     Elbow   Left Elbow   Intact: light touch    Right Elbow   Intact: light touch    Hip   Left Hip   Intact: light touch    Right Hip   Intact: light touch    Active Range of Motion     Left Elbow   Normal active range of motion    Left Wrist   Normal active range of motion  Left Hip   Normal active range of motion    Right Hip   Normal active range of motion    Passive Range of Motion     Additional Passive Range of Motion Details  Mod tightness L H/S, none L piriformis.     Strength/Myotome Testing     Left Elbow   Normal strength    Left Wrist/Hand   Normal wrist strength    Left Hip   Normal muscle strength    Right Hip   Normal muscle strength          Precautions: None  CO-MORBIDITIES:  None  HEP ACCESS CODE:   FOTO Completed On:     POC Expires Reeval for Medicare to be completed Unit LImit Auth  "Expiration Date PT/OT/STVisit Limit   4/14/25 By visit  N/A N/A 1/31/26 30                     TREATMENT DIARY  Auth Status DATE 2/17        Approved Visit # 1         Remain 29        MANUAL THERAPY         Graston distal L triceps 5'                                                     THERAPEUTIC EXERCISE HEP         Doorway L H/S C/R stretch 2/17 20\" 5        Strap supine L H/S - ITB stretch 2/17 20\" 5        Supine crossed leg stretch to R only 2/17 20\" 5        L wrist flex/ext stretch 2/17 20\" 3 ea                                                                                                                      NEUROMUSCULAR REEDUCATION                                                                                                                                                       THERAPEUTIC ACTIVITY                                                  GAIT TRAINING                                                  MODALITIES                                                 "

## 2025-02-24 ENCOUNTER — OFFICE VISIT (OUTPATIENT)
Dept: PHYSICAL THERAPY | Facility: CLINIC | Age: 55
End: 2025-02-24
Payer: COMMERCIAL

## 2025-02-24 DIAGNOSIS — S76.312D STRAIN OF LEFT PIRIFORMIS MUSCLE, SUBSEQUENT ENCOUNTER: Primary | ICD-10-CM

## 2025-02-24 DIAGNOSIS — M77.02 MEDIAL EPICONDYLITIS OF ELBOW, LEFT: ICD-10-CM

## 2025-02-24 PROCEDURE — 97110 THERAPEUTIC EXERCISES: CPT | Performed by: PHYSICAL THERAPIST

## 2025-02-24 PROCEDURE — 97140 MANUAL THERAPY 1/> REGIONS: CPT | Performed by: PHYSICAL THERAPIST

## 2025-02-24 NOTE — PROGRESS NOTES
"Daily Note     Today's date: 2025  Patient name: Wayne Callahan  : 1970  MRN: [id number]  Referring provider: Edilberto Stanton MD  Dx:   Encounter Diagnosis     ICD-10-CM    1. Strain of left piriformis muscle, subsequent encounter  S76.312D       2. Medial epicondylitis of elbow, left  M77.02                Subjective:  Pt reports his elbow feels quite a bit better, but upper L H/S is still bothersome.       Objective:  See treatment diary below      Assessment:  Pt presented to physical therapy with complaints of L upper H/S and posterior-lateral elbow pain due to a strain of left piriformis muscle with upper hamstring tightness, medial epicondylitis of elbow, left that has shifted to distal L triceps.  His triceps tightness is almost fully eliminated now and L H/S flexibility is improving, but still not allowing him to run yet.  He will continue to benefit from skilled PT services in order to address these deficits and reach maximum level of function.       Plan:  Continue 1x/wk with focus on L prox H/S mobility.  Elbow tx prn.        Precautions: None  CO-MORBIDITIES:  None  HEP ACCESS CODE:   FOTO Completed On:     POC Expires Reeval for Medicare to be completed Unit LImit Auth Expiration Date PT/OT/STVisit Limit   25 By visit  N/A N/A 26 30                     TREATMENT DIARY  Auth Status DATE        Approved Visit # 1 2        Remain 29 28       MANUAL THERAPY         Graston distal L triceps 5' 5'       L H/S C/R in supine  5'                                           THERAPEUTIC EXERCISE HEP         Doorway L H/S C/R stretch  20\" 5 HEP       Strap supine L H/S - ITB stretch  20\" 5 20\" 5       Supine crossed leg stretch to R only  20\" 5 20\" 5       L wrist flex/ext stretch  20\" 3 ea 20\" 3 ea       Elliptical   L1 5'       UBE   L3 2'/2'       TB triceps ext L   Blue 30       Seated C/R L H/S stretch   10\" 5       Supine L sciatic nerve sliders   10     "                                                               NEUROMUSCULAR REEDUCATION                                                                                                                                                       THERAPEUTIC ACTIVITY                                                  GAIT TRAINING                                                  MODALITIES

## 2025-03-03 ENCOUNTER — CONSULT (OUTPATIENT)
Dept: GASTROENTEROLOGY | Facility: CLINIC | Age: 55
End: 2025-03-03
Payer: COMMERCIAL

## 2025-03-03 VITALS
BODY MASS INDEX: 22.51 KG/M2 | SYSTOLIC BLOOD PRESSURE: 129 MMHG | DIASTOLIC BLOOD PRESSURE: 77 MMHG | HEIGHT: 69 IN | WEIGHT: 152 LBS

## 2025-03-03 DIAGNOSIS — Z80.0 FAMILY HISTORY OF COLON CANCER: ICD-10-CM

## 2025-03-03 DIAGNOSIS — K92.1 HEMATOCHEZIA: Primary | ICD-10-CM

## 2025-03-03 PROCEDURE — 99204 OFFICE O/P NEW MOD 45 MIN: CPT | Performed by: INTERNAL MEDICINE

## 2025-03-03 RX ORDER — SODIUM PICOSULFATE, MAGNESIUM OXIDE, AND ANHYDROUS CITRIC ACID 12; 3.5; 1 G/175ML; G/175ML; MG/175ML
LIQUID ORAL
Qty: 350 ML | Refills: 0 | Status: SHIPPED | OUTPATIENT
Start: 2025-03-03

## 2025-03-03 NOTE — PROGRESS NOTES
"Name: Wayne Callahan      : 1970      MRN: [id number]  Encounter Provider: James Brown DO  Encounter Date: 3/3/2025   Encounter department: [facility]  :  Assessment & Plan  Hematochezia  Likely hemorrhoidal in the setting of constipation, however I will proceed with colonoscopy because of his family history to rule out neoplasm.  Procedure risks and preparation discussed in detail  Orders:    sodium picosulfate, magnesium oxide, citric acid (Clenpiq) oral solution; Take 175 mL (1 bottle) the evening before the colonoscopy, between 5 PM and 9 PM, followed by a second 175 mL bottle 5 hours before the colonoscopy.    Family history of colon cancer  Colonoscopy as above           History of Present Illness   HPI  Wayne Callahan is a 54 y.o. male who presents in consultation from his PCP due to recent onset of hematochezia.  He states for the last year or so he is noticed blood with his stools pretty much every day.  He has always been on the side of constipation, however he does think this is worsened.  Sometimes he will go a couple of days without a bowel movement, but he will finally either spontaneously have 1 or use MiraLAX to go.  He denies abdominal pain and mucus in the stool.  He did have a colonoscopy in  that showed no growths or polyps and was told to repeat it in .  However because of his family history (his paternal grandfather passed away from colon cancer) he is concerned with the bleeding.      Review of Systems       Objective   /77   Ht 5' 9\" (1.753 m)   Wt 68.9 kg (152 lb)   BMI 22.45 kg/m²      Physical Exam  General Appearance: NAD, cooperative, alert  Eyes: Anicteric  GI:  Soft, non-tender, non-distended; normal bowel sounds; no masses, no organomegaly   Rectal: Deferred  Musculoskeletal: No edema.  Skin:  No jaundice      "

## 2025-03-04 ENCOUNTER — OFFICE VISIT (OUTPATIENT)
Dept: PHYSICAL THERAPY | Facility: CLINIC | Age: 55
End: 2025-03-04
Payer: COMMERCIAL

## 2025-03-04 DIAGNOSIS — M77.02 MEDIAL EPICONDYLITIS OF ELBOW, LEFT: ICD-10-CM

## 2025-03-04 DIAGNOSIS — S76.312D STRAIN OF LEFT PIRIFORMIS MUSCLE, SUBSEQUENT ENCOUNTER: Primary | ICD-10-CM

## 2025-03-04 PROCEDURE — 97110 THERAPEUTIC EXERCISES: CPT | Performed by: PHYSICAL THERAPIST

## 2025-03-04 PROCEDURE — 97140 MANUAL THERAPY 1/> REGIONS: CPT | Performed by: PHYSICAL THERAPIST

## 2025-03-04 NOTE — PROGRESS NOTES
"Daily Note     Today's date: 3/4/2025  Patient name: Wayne Callahan  : 1970  MRN: [id number]  Referring provider: Edilberto Stanton MD  Dx:   Encounter Diagnosis     ICD-10-CM    1. Strain of left piriformis muscle, subsequent encounter  S76.312D       2. Medial epicondylitis of elbow, left  M77.02                Subjective:  Pt reports his elbow continues to feel better, but upper L H/S is still bothersome.  Stretched yesterday, but didn't warm up before and stretching hurt a little.       Objective:  See treatment diary below      Assessment:  Pt presented to physical therapy with complaints of L upper H/S and posterior-lateral elbow pain due to a strain of left piriformis muscle with upper hamstring tightness, medial epicondylitis of elbow, left that shifted to distal L triceps.  His triceps tightness is almost fully eliminated now and L H/S flexibility is improving, but still not allowing him to run yet.  Most tightness is now posterior L hip capsule vs H/S.  He will continue to benefit from skilled PT services in order to address these deficits and reach maximum level of function.       Plan:  PT prn.  Focus on L posterior hip stretching.        Precautions: None  CO-MORBIDITIES:  None  HEP ACCESS CODE:   FOTO Completed On:     POC Expires Reeval for Medicare to be completed Unit LImit Auth Expiration Date PT/OT/STVisit Limit   25 By visit  N/A N/A 26 30                     TREATMENT DIARY  Auth Status DATE 2/17 2/24 3/4      Approved Visit # 1 2 3       Remain 29 28 27      MANUAL THERAPY         [facility] distal L triceps 5' 5' 5'      L H/S C/R in supine  5' 5'                                          THERAPEUTIC EXERCISE HEP         Doorway L H/S C/R stretch  20\" 5 HEP       Strap supine L H/S - ITB stretch  20\" 5 20\" 5 20\" 5      Supine crossed leg stretch to R only  20\" 5 20\" 5 20\" 5      L wrist flex/ext stretch  20\" 3 ea 20\" 3 ea       Elliptical   L1 5' L1 5'      UBE   L3 " "2'/2' L3 2'/2'      TB triceps ext L 2/24  Blue 30 Blue 30      Seated C/R L H/S stretch 2/24  10\" 5       Supine L sciatic nerve sliders 2/24  10 10      Figure 4 posterior capsule stretch L hip with strap 3/4   30\" 3                                                        NEUROMUSCULAR REEDUCATION                                                                                                                                                       THERAPEUTIC ACTIVITY                                                  GAIT TRAINING                                                  MODALITIES                               "

## 2025-04-08 ENCOUNTER — TELEPHONE (OUTPATIENT)
Dept: GASTROENTEROLOGY | Facility: CLINIC | Age: 55
End: 2025-04-08

## 2025-04-08 ENCOUNTER — ANESTHESIA (OUTPATIENT)
Dept: ANESTHESIOLOGY | Facility: AMBULATORY SURGERY CENTER | Age: 55
End: 2025-04-08

## 2025-04-08 ENCOUNTER — ANESTHESIA EVENT (OUTPATIENT)
Dept: ANESTHESIOLOGY | Facility: AMBULATORY SURGERY CENTER | Age: 55
End: 2025-04-08

## 2025-04-08 NOTE — TELEPHONE ENCOUNTER
Procedure confirmed  Colonoscopy     Via: Voice mail    Instructions given: Given to Patient at Visit     Prep Given: Clenpiq    Call the office if there are any questions.

## 2025-04-21 ENCOUNTER — TELEPHONE (OUTPATIENT)
Dept: GASTROENTEROLOGY | Facility: AMBULATORY SURGERY CENTER | Age: 55
End: 2025-04-21

## 2025-04-22 ENCOUNTER — ANESTHESIA (OUTPATIENT)
Dept: GASTROENTEROLOGY | Facility: AMBULATORY SURGERY CENTER | Age: 55
End: 2025-04-22

## 2025-04-22 ENCOUNTER — ANESTHESIA EVENT (OUTPATIENT)
Dept: GASTROENTEROLOGY | Facility: AMBULATORY SURGERY CENTER | Age: 55
End: 2025-04-22

## 2025-04-22 ENCOUNTER — HOSPITAL ENCOUNTER (OUTPATIENT)
Dept: GASTROENTEROLOGY | Facility: AMBULATORY SURGERY CENTER | Age: 55
Discharge: HOME | End: 2025-04-22
Attending: INTERNAL MEDICINE
Payer: COMMERCIAL

## 2025-04-22 VITALS
HEART RATE: 55 BPM | WEIGHT: 152 LBS | TEMPERATURE: 100.2 F | BODY MASS INDEX: 22.51 KG/M2 | SYSTOLIC BLOOD PRESSURE: 111 MMHG | HEIGHT: 69 IN | DIASTOLIC BLOOD PRESSURE: 68 MMHG | OXYGEN SATURATION: 98 % | RESPIRATION RATE: 17 BRPM

## 2025-04-22 DIAGNOSIS — Z80.0 FAMILY HISTORY OF COLON CANCER: ICD-10-CM

## 2025-04-22 PROCEDURE — 45380 COLONOSCOPY AND BIOPSY: CPT | Performed by: INTERNAL MEDICINE

## 2025-04-22 PROCEDURE — 88305 TISSUE EXAM BY PATHOLOGIST: CPT | Performed by: PATHOLOGY

## 2025-04-22 RX ORDER — PROPOFOL 10 MG/ML
INJECTION, EMULSION INTRAVENOUS AS NEEDED
Status: DISCONTINUED | OUTPATIENT
Start: 2025-04-22 | End: 2025-04-22

## 2025-04-22 RX ORDER — SODIUM CHLORIDE, SODIUM LACTATE, POTASSIUM CHLORIDE, CALCIUM CHLORIDE 600; 310; 30; 20 MG/100ML; MG/100ML; MG/100ML; MG/100ML
50 INJECTION, SOLUTION INTRAVENOUS CONTINUOUS
Status: DISCONTINUED | OUTPATIENT
Start: 2025-04-22 | End: 2025-04-26 | Stop reason: HOSPADM

## 2025-04-22 RX ORDER — PROPOFOL 10 MG/ML
INJECTION, EMULSION INTRAVENOUS CONTINUOUS PRN
Status: DISCONTINUED | OUTPATIENT
Start: 2025-04-22 | End: 2025-04-22

## 2025-04-22 RX ADMIN — PROPOFOL 150 MG: 10 INJECTION, EMULSION INTRAVENOUS at 13:01

## 2025-04-22 RX ADMIN — PROPOFOL 120 MCG/KG/MIN: 10 INJECTION, EMULSION INTRAVENOUS at 13:01

## 2025-04-22 RX ADMIN — SODIUM CHLORIDE, SODIUM LACTATE, POTASSIUM CHLORIDE, CALCIUM CHLORIDE 50 ML/HR: 600; 310; 30; 20 INJECTION, SOLUTION INTRAVENOUS at 12:48

## 2025-04-22 NOTE — ANESTHESIA PREPROCEDURE EVALUATION
Procedure:  COLONOSCOPY    Relevant Problems   No relevant active problems        Physical Exam    Airway    Mallampati score: I  TM Distance: >3 FB  Neck ROM: full     Dental       Cardiovascular  Cardiovascular exam normal    Pulmonary  Pulmonary exam normal     Other Findings        Anesthesia Plan  ASA Score- 1     Anesthesia Type- IV sedation with anesthesia with ASA Monitors.         Additional Monitors:     Airway Plan:            Plan Factors-Exercise tolerance (METS): >4 METS.    Chart reviewed. EKG reviewed. Imaging results reviewed. Existing labs reviewed. Patient summary reviewed.                  Induction- intravenous.    Postoperative Plan-         Informed Consent- Anesthetic plan and risks discussed with patient.  I personally reviewed this patient with the CRNA. Discussed and agreed on the Anesthesia Plan with the CRNA..      NPO Status:  No vitals data found for the desired time range.

## 2025-04-22 NOTE — H&P
History and Physical - [facility]  Wayne Callahan 54 y.o. male MRN: [id number]    HPI: Wayne Callahan is a 54 y.o. male who presents for colonoscopy due to hematochezia.  He does have a family history of colon cancer in his paternal grandfather.  His last colonoscopy was in 2019, at which time he was given an interval of 10 years for repeat.    REVIEW OF SYSTEMS: Per the HPI, and otherwise unremarkable.    Historical Information   Past Medical History:   Diagnosis Date    Hernia 3/2023?    Status quo since diagnosis    Macular degeneration      Past Surgical History:   Procedure Laterality Date    COLONOSCOPY      EGD  05/10/2017    HAND SURGERY Left 2019    TFCC repair    KNEE SURGERY       Social History   Social History     Substance and Sexual Activity   Alcohol Use Yes    Alcohol/week: 4.0 standard drinks of alcohol    Types: 4 Cans of beer per week    Comment: drank in the past     Social History     Substance and Sexual Activity   Drug Use Never     Social History     Tobacco Use   Smoking Status Never   Smokeless Tobacco Never     Family History   Problem Relation Age of Onset    No Known Problems Mother     Heart disease Father     Colon cancer Other     Stroke Maternal Grandfather              Cancer Paternal Grandfather          from colon cancer     Diabetes Paternal Grandmother         1972    Colon polyps Neg Hx        Meds/Allergies       Current Outpatient Medications:     Multiple Vitamins-Minerals (EYE VITAMINS & MINERALS PO)    Omega-3 Fatty Acids (Fish Oil) 1200 MG CAPS    sodium picosulfate, magnesium oxide, citric acid (Clenpiq) oral solution    Loratadine 10 MG CAPS    Multiple Vitamins-Minerals (MENS ONE DAILY PO)    Na Sulfate-K Sulfate-Mg Sulf (SUPREP BOWEL PREP KIT) 17.5-3.13-1.6 GM/177ML SOLN    Current Facility-Administered Medications:     lactated ringers infusion, 50 mL/hr, Intravenous, Continuous, 50 mL/hr at 25  "1248    Allergies   Allergen Reactions    Grass Pollen(K-O-R-T-Swt Víctor) Itching and Sneezing    Pollen Extract Sneezing       Objective     /75   Pulse 74   Temp 100.2 °F (37.9 °C) (Temporal)   Resp 19   Ht 5' 9\" (1.753 m)   Wt 68.9 kg (152 lb)   SpO2 95%   BMI 22.45 kg/m²     PHYSICAL EXAM    General Appearance: NAD, cooperative, alert  Eyes: Anicteric  GI:  Soft, non-tender, non-distended; normal bowel sounds; no masses, no organomegaly   Rectal: Deferred until procedure  Musculoskeletal: No edema.  Skin:  No jaundice    ASSESSMENT/PLAN:  This is a 54 y.o. male here for colonoscopy, and he is stable and optimized for his procedure.        "

## 2025-04-29 ENCOUNTER — RESULTS FOLLOW-UP (OUTPATIENT)
Dept: GASTROENTEROLOGY | Facility: CLINIC | Age: 55
End: 2025-04-29

## 2025-04-29 PROCEDURE — 88305 TISSUE EXAM BY PATHOLOGIST: CPT | Performed by: PATHOLOGY

## 2025-06-05 ENCOUNTER — NURSE TRIAGE (OUTPATIENT)
Age: 55
End: 2025-06-05

## 2025-06-05 DIAGNOSIS — K64.9 HEMORRHOIDS, UNSPECIFIED HEMORRHOID TYPE: Primary | ICD-10-CM

## 2025-06-05 RX ORDER — HYDROCORTISONE 25 MG/G
1 CREAM TOPICAL 2 TIMES DAILY
Qty: 28 G | Refills: 0 | Status: SHIPPED | OUTPATIENT
Start: 2025-06-05

## 2025-06-05 NOTE — TELEPHONE ENCOUNTER
REASON FOR CONVERSATION: Hemorrhoids    SYMPTOMS: Wife calling (consent verified), patient out of town.  Patient having rectal pain from hemorrhoids.  She is unsure if bleeding but has been in pain for about 1 week.  Does deal with constipation but takes stool softener.  Using otc medication with no relief.  Would like script sent to [facility] in [place].      OTHER HEALTH INFORMATION: Also obtained pamphlet in d/c paperwork from colonoscopy about [facility] [employer] [facility] [facility].  Would like to know what this is about.      PROTOCOL DISPOSITION: Home Care    CARE ADVICE PROVIDED: per protocol script sent,   Anusol 2.5% cream to apply to the affected area BID, for 10 days only. Do not take longer than 10 days a month. Quantity of one container, with no refills  Treat constipation.      PRACTICE FOLLOW-UP: regarding paper work received.         Reason for Disposition   Affirmative: The patient has constipation with BRBPR (bright red blood per rectum)    Answer Assessment - Initial Assessment Questions  1. When did your symptoms start?   About a week ago  2. Is there bright red blood when wiping or streaks in the stool? If yes, how many occurrences have you had in the last 24 hours?  Unsure if bleeding, wife unsure.  States probably is    4. Have your symptoms improved or weakened?   same  5. Are you experiencing more diarrhea or constipation?   constipation  6. Do you have anything prescribed or over the counter for this? XXX If so, did they offer any relief?   Over the counter  7. Are you experiencing any additional symptoms? If yes, please describe what your symptoms are.   denies    Protocols used: GI-Hemorrhoids-ADULT-[place]

## 2025-06-09 ENCOUNTER — TELEPHONE (OUTPATIENT)
Age: 55
End: 2025-06-09

## 2025-06-09 NOTE — TELEPHONE ENCOUNTER
Patient contacted office to arrange appt with Dr. Brown. As per office patient should be scheduled with another provider, as Dr. Brown doesn't do hemorrhoid treatment. Call disconnected. Attempted to contact pt back, left message.

## 2025-06-09 NOTE — TELEPHONE ENCOUNTER
Patient is calling back regarding hemorrhoids.  Using anusol for several days with no relief.  Would like to discuss possible banding or what else may help.  Patient taking miralax daily, avoiding straining but not getting better.

## 2025-07-18 ENCOUNTER — OFFICE VISIT (OUTPATIENT)
Dept: GASTROENTEROLOGY | Facility: CLINIC | Age: 55
End: 2025-07-18
Payer: COMMERCIAL

## 2025-07-18 VITALS
DIASTOLIC BLOOD PRESSURE: 80 MMHG | SYSTOLIC BLOOD PRESSURE: 140 MMHG | BODY MASS INDEX: 22.22 KG/M2 | WEIGHT: 150 LBS | HEIGHT: 69 IN

## 2025-07-18 DIAGNOSIS — K62.5 RECTAL BLEEDING: ICD-10-CM

## 2025-07-18 DIAGNOSIS — K59.00 CONSTIPATION, UNSPECIFIED CONSTIPATION TYPE: Primary | ICD-10-CM

## 2025-07-18 DIAGNOSIS — K64.9 HEMORRHOIDS, UNSPECIFIED HEMORRHOID TYPE: ICD-10-CM

## 2025-07-18 PROCEDURE — 46221 LIGATION OF HEMORRHOID(S): CPT | Performed by: INTERNAL MEDICINE

## 2025-07-18 PROCEDURE — 99213 OFFICE O/P EST LOW 20 MIN: CPT | Performed by: INTERNAL MEDICINE

## 2025-07-18 NOTE — PROGRESS NOTES
Name: Wayne Callahan      : 1970      MRN: [id number]  Encounter Provider: Angelito Cain MD  Encounter Date: 2025   Encounter department: [facility]      Assessment & Plan  1. Hematochezia and constipation  - Increase fluid intake  - Incorporate regular physical activity  - Limit toilet time to 5 minutes  - Add fiber to diet, starting with 2 teaspoons of Benefiber daily  - Explained banding procedure, including risks and benefits  - Agreed to proceed with first session of banding today  - Banding procedure performed successfully    [facility] - Hemorrhoid Banding Wayne Callahan 54 y.o. male MRN: [id number]  Encounter: [id number]    ASSESSMENT AND PLAN:    The right anterior hemorrhoid area was banded 2025. The patient was instructed to avoid constipation and straining, and educated in appropriate fiber intake.     HPI: Wayne Callahan is a 54 y.o. year old male who presents with rectal bleeding due to hemorrhoids.     Previous treatments include: none  Bands were previously placed: none   Current Fiber intake: encouraged supplementation  Complications of prior therapy include: na    The patient provided consent for banding  and was placed in the left lateral position  Rectal exam showed external hemorrhoid in RA. Non thrombosed  The [employer]Jean-Pierre ligator was advanced and a band was applied without difficulty   Repeat rectal exam confirmed appropriate placement  The patient was discharged home after observation in the waiting area    PROCEDURE  Procedure Performed  Rubber band ligation of internal hemorrhoids    Technique  Rectal examination followed by placement of rubber band above hemorrhoid to create scar tissue and reposition hemorrhoid.  Patient tolerated procedure well.    Results  - Colonoscopy:    - Internal hemorrhoids     Problem List Items Addressed This Visit    None  Visit Diagnoses         Constipation, unspecified constipation type   "  -  Primary      Rectal bleeding          Hemorrhoids, unspecified hemorrhoid type                 Chief Complaint   Patient presents with    banding     Consent signed       History of Present Illness  The patient, a 54-year-old male, presents with hematochezia, likely attributable to internal hemorrhoids identified during a recent colonoscopy.    Hematochezia  - The patient reports an improvement in rectal bleeding, which he associates with constipation.  - He describes the appearance of his hemorrhoids as resembling a blueberry and expresses concern about potential rupture and subsequent bleeding.  - A previous colonoscopy revealed the presence of small hemorrhoids.    Constipation  - The patient has increased his fluid intake despite a lack of thirst, resulting in some improvement in constipation, although it persists to a slight degree.  - The patient notes that the consumption of bananas exacerbates his constipation, leading to straining and subsequent hemorrhoid flare-ups.  - He manages his constipation without the use of MiraLAX, with symptoms fluctuating based on his diet, hydration, and daily schedule.    Treatment  - The patient used 2.5% hydrocortisone for 10 days without experiencing relief and discontinued its use as recommended after the 10-day period.    SOCIAL HISTORY  - Marital Status:   - Diet: Consumes banana bread       Historical Information   Past Medical History[1]  Past Surgical History[2]  Social History     Substance and Sexual Activity   Alcohol Use Yes    Alcohol/week: 4.0 standard drinks of alcohol    Types: 4 Cans of beer per week    Comment: drank in the past     Social History     Substance and Sexual Activity   Drug Use Never     Tobacco Use History[3]  Family History[4]    Meds/Allergies   Current Medications[5]  Allergies[6]    PHYSICAL EXAM:    Blood pressure 140/80, height 5' 9\" (1.753 m), weight 68 kg (150 lb). Body mass index is 22.15 kg/m².  Physical Exam  - Rectal:   " " - External hemorrhoids in the RA noted.    - Rubber band ligation performed  General Appearance: No apparent distress, cooperative, alert.  Eyes: Anicteric.  Gastrointestinal: Soft, non-tender, non-distended; normal bowel sounds; no masses, no organomegaly.    Musculoskeletal: No edema.  Skin: No jaundice.     OTHER LAB RESULTS:   No results found for: \"WBC\", \"HGB\", \"MCV\", \"PLT\", \"INR\"  No results found for: \"NA\", \"K\", \"CL\", \"CO2\", \"ANIONGAP\", \"BUN\", \"CREATININE\", \"GLUCOSE\", \"GLUF\", \"CALCIUM\", \"CORRECTEDCA\", \"AST\", \"ALT\", \"ALKPHOS\", \"ALB\", \"TBILI\", \"EGFR\"  No results found for: \"IRON\", \"TIBC\", \"FERRITIN\"  No results found for: \"LIPASE\"    OTHER RADIOLOGY RESULTS:   No results found.       [1]   Past Medical History:  Diagnosis Date    Hernia 3/2023?    Status quo since diagnosis    Macular degeneration    [2]   Past Surgical History:  Procedure Laterality Date    COLONOSCOPY      EGD  05/10/2017    HAND SURGERY Left 2019    TFCC repair    KNEE SURGERY     [3]   Social History  Tobacco Use   Smoking Status Never   Smokeless Tobacco Never   [4]   Family History  Problem Relation Name Age of Onset    No Known Problems Mother      Heart disease Father Franck Callahan     Colon cancer Other Grandfather     Stroke Maternal Grandfather Óscar Westbrook              Cancer Paternal Grandfather Naren Callahan          from colon cancer     Diabetes Paternal Grandmother Sunshine Callahan         ,     Colon polyps Neg Hx     [5]   Current Outpatient Medications:     hydrocortisone (ANUSOL-HC) 2.5 % rectal cream    Loratadine 10 MG CAPS    Misc Natural Products (Turmeric, Curcumin,) CAPS    Multiple Vitamins-Minerals (EYE VITAMINS & MINERALS PO)  [6]   Allergies  Allergen Reactions    Grass Pollen(K-O-R-T-Swt Víctor) Itching and Sneezing    Pollen Extract Sneezing     "